# Patient Record
Sex: MALE | Race: WHITE | NOT HISPANIC OR LATINO | Employment: UNEMPLOYED | URBAN - METROPOLITAN AREA
[De-identification: names, ages, dates, MRNs, and addresses within clinical notes are randomized per-mention and may not be internally consistent; named-entity substitution may affect disease eponyms.]

---

## 2019-07-16 LAB — CRC RECOMMENDATION EXT: NORMAL

## 2019-10-02 ENCOUNTER — HISTORICAL (OUTPATIENT)
Dept: ADMINISTRATIVE | Facility: HOSPITAL | Age: 44
End: 2019-10-02

## 2019-10-10 ENCOUNTER — HISTORICAL (OUTPATIENT)
Dept: SURGERY | Facility: HOSPITAL | Age: 44
End: 2019-10-10

## 2020-08-04 ENCOUNTER — OFFICE VISIT (OUTPATIENT)
Dept: PRIMARY CARE CLINIC | Facility: CLINIC | Age: 45
End: 2020-08-04
Payer: MEDICAID

## 2020-08-04 VITALS
HEART RATE: 69 BPM | RESPIRATION RATE: 18 BRPM | OXYGEN SATURATION: 97 % | DIASTOLIC BLOOD PRESSURE: 88 MMHG | SYSTOLIC BLOOD PRESSURE: 138 MMHG | WEIGHT: 304.63 LBS | HEIGHT: 74 IN | BODY MASS INDEX: 39.1 KG/M2

## 2020-08-04 DIAGNOSIS — E78.00 PURE HYPERCHOLESTEROLEMIA: ICD-10-CM

## 2020-08-04 DIAGNOSIS — Z11.4 ENCOUNTER FOR SCREENING FOR HIV: ICD-10-CM

## 2020-08-04 DIAGNOSIS — Z11.59 NEED FOR HEPATITIS C SCREENING TEST: ICD-10-CM

## 2020-08-04 DIAGNOSIS — F41.9 ANXIETY: ICD-10-CM

## 2020-08-04 DIAGNOSIS — R11.2 NON-INTRACTABLE VOMITING WITH NAUSEA, UNSPECIFIED VOMITING TYPE: ICD-10-CM

## 2020-08-04 DIAGNOSIS — E07.9 THYROID DISEASE: ICD-10-CM

## 2020-08-04 DIAGNOSIS — Z12.5 SCREENING PSA (PROSTATE SPECIFIC ANTIGEN): ICD-10-CM

## 2020-08-04 DIAGNOSIS — E53.8 VITAMIN B12 DEFICIENCY: ICD-10-CM

## 2020-08-04 DIAGNOSIS — I10 ESSENTIAL HYPERTENSION: Primary | ICD-10-CM

## 2020-08-04 DIAGNOSIS — E55.9 VITAMIN D DEFICIENCY: ICD-10-CM

## 2020-08-04 DIAGNOSIS — Z79.899 LONG TERM USE OF DRUG: ICD-10-CM

## 2020-08-04 DIAGNOSIS — R16.0 LIVER MASS: ICD-10-CM

## 2020-08-04 DIAGNOSIS — R10.11 RUQ PAIN: ICD-10-CM

## 2020-08-04 DIAGNOSIS — C96.9 MALIGNANT NEOPLASM OF LYMPHOID, HEMATOPOIETIC AND RELATED TISSUE, UNSPECIFIED: ICD-10-CM

## 2020-08-04 DIAGNOSIS — E88.819 INSULIN RESISTANCE: ICD-10-CM

## 2020-08-04 DIAGNOSIS — N30.01 ACUTE CYSTITIS WITH HEMATURIA: ICD-10-CM

## 2020-08-04 PROCEDURE — 99204 PR OFFICE/OUTPT VISIT, NEW, LEVL IV, 45-59 MIN: ICD-10-PCS | Mod: S$GLB,,, | Performed by: NURSE PRACTITIONER

## 2020-08-04 PROCEDURE — 99204 OFFICE O/P NEW MOD 45 MIN: CPT | Mod: S$GLB,,, | Performed by: NURSE PRACTITIONER

## 2020-08-04 RX ORDER — SERTRALINE HYDROCHLORIDE 50 MG/1
50 TABLET, FILM COATED ORAL DAILY
Qty: 30 TABLET | Refills: 0 | Status: SHIPPED | OUTPATIENT
Start: 2020-08-04 | End: 2020-09-14 | Stop reason: SDUPTHER

## 2020-08-04 RX ORDER — LISINOPRIL 10 MG/1
10 TABLET ORAL DAILY
Qty: 30 TABLET | Refills: 2 | Status: SHIPPED | OUTPATIENT
Start: 2020-08-04 | End: 2020-08-12 | Stop reason: SDUPTHER

## 2020-08-04 NOTE — PATIENT INSTRUCTIONS
Continue medications as directed.     Continue to follow up with specialists as directed.    Report to nearest ER or call 911 if you begin to have worsening symptoms, difficulty breathing, turning blue, chest pain, B/P < 80/60 or >170/100, palpitations, syncope, extreme weakness, severe abdominal pain, or severe H/A. Patient verbalized understanding.     RTC in 1 week to review and discuss results.      Established High Blood Pressure    High blood pressure (hypertension) is a chronic disease. Often, healthcare providers dont know what causes it. But it can be caused by certain health conditions and medicines.  If you have high blood pressure, you may not have any symptoms. If you do have symptoms, they may include headache, dizziness, changes in your vision, chest pain, and shortness of breath. But even without symptoms, high blood pressure thats not treated raises your risk for heart attack and stroke. High blood pressure is a serious health risk and shouldnt be ignored.  A blood pressure reading is made up of two numbers: a higher number over a lower number. The top number is the systolic pressure. The bottom number is the diastolic pressure. A normal blood pressure is a systolic pressure of  less than 120 over a diastolic pressure of less than 80. You will see your blood pressure readings written together. For example, a person with a systolic pressure of 188 and a diastolic pressure of 78 will have 118/78 written in the medical record.  High blood pressure is when either the top number is 140 or higher, or the bottom number is 90 or higher. This must be the result when taking your blood pressure a number of times. The blood pressures between normal and high are called prehypertension.  Home care  If you have high blood pressure, you should do what is listed below to lower your blood pressure. If you are taking medicines for high blood pressure, these methods may reduce or end your need for medicines in the  future.  · Begin a weight-loss program if you are overweight.  · Cut back on how much salt you get in your diet. Heres how to do this:  ¨ Dont eat foods that have a lot of salt. These include olives, pickles, smoked meats, and salted potato chips.  ¨ Dont add salt to your food at the table.  ¨ Use only small amounts of salt when cooking.  · Start an exercise program. Talk with your healthcare provider about the type of exercise program that would be best for you. It doesn't have to be hard. Even brisk walking for 20 minutes 3 times a week is a good form of exercise.  · Dont take medicines that stimulate the heart. This includes many over-the-counter cold and sinus decongestant pills and sprays, as well as diet pills. Check the warnings about hypertension on the label. Before buying any over-the-counter medicines or supplements, always ask the pharmacist about the product's potential interaction with your high blood pressure and your high blood pressure medicines.  · Stimulants such as amphetamine or cocaine could be deadly for someone with high blood pressure. Never take these.  · Limit how much caffeine you get in your diet. Switch to caffeine-free products.  · Stop smoking. If you are a long-time smoker, this can be hard. Talk to your healthcare provider about medicines and nicotine replacement options to help you. Also, enroll in a stop-smoking program to make it more likely that you will quit for good.  · Learn how to handle stress. This is an important part of any program to lower blood pressure. Learn about relaxation methods like meditation, yoga, or biofeedback.  · If your provider prescribed medicines, take them exactly as directed. Missing doses may cause your blood pressure get out of control.  · If you miss a dose or doses, check with your healthcare provider or pharmacist about what to do.  · Consider buying an automatic blood pressure machine. Ask your provider for a recommendation. You can get one  of these at most pharmacies.     The American Heart Association recommends the following guidelines for home blood pressure monitoring:  · Don't smoke or drink coffee for 30 minutes before taking your blood pressure.  · Go to the bathroom before the test.  · Relax for 5 minutes before taking the measurement.  · Sit with your back supported (don't sit on a couch or soft chair); keep your feet on the floor uncrossed. Place your arm on a solid flat surface (like a table) with the upper part of the arm at heart level. Place the middle of the cuff directly above the eye of the elbow. Check the monitor's instruction manual for an illustration.  · Take multiple readings. When you measure, take 2 to 3 readings one minute apart and record all of the results.  · Take your blood pressure at the same time every day, or as your healthcare provider recommends.  · Record the date, time, and blood pressure reading.  · Take the record with you to your next medical appointment. If your blood pressure monitor has a built-in memory, simply take the monitor with you to your next appointment.  · Call your provider if you have several high readings. Don't be frightened by a single high blood pressure reading, but if you get several high readings, check in with your healthcare provider.  · Note: When blood pressure reaches a systolic (top number) of 180 or higher OR diastolic (bottom number) of 110 or higher, seek emergency medical treatment.  Follow-up care  You will need to see your healthcare provider regularly. This is to check your blood pressure and to make changes to your medicines. Make a follow-up appointment as directed. Bring the record of your home blood pressure readings to the appointment.  When to seek medical advice  Call your healthcare provider right away if any of these occur:  · Blood pressure reaches a systolic (upper number) of 180 or higher OR a diastolic (bottom number) of 110 or higher  · Chest pain or shortness of  breath  · Severe headache  · Throbbing or rushing sound in the ears  · Nosebleed  · Sudden severe pain in your belly (abdomen)  · Extreme drowsiness, confusion, or fainting  · Dizziness or spinning sensation (vertigo)  · Weakness of an arm or leg or one side of the face  · You have problems speaking or seeing   Date Last Reviewed: 12/1/2016  © 2865-7281 [employer]. [address] [place] [address], [place] [address]. All rights reserved. This information is not intended as a substitute for professional medical care. Always follow your healthcare professional's instructions.          Treating Anxiety Disorders with Medicine  An anxiety disorder can make you feel nervous or apprehensive, even without a clear reason. In people age 65 and older, generalized anxiety disorder is one of the most commonly diagnosed anxiety disorders. Many times it occurs with depression. Certain anxiety disorders can cause intense feelings of fear or panic. You may even have physical symptoms such as a racing heartbeat, sweating, or dizziness. If you have these feelings, you dont have to suffer anymore. Treatment to help you overcome your fears will likely include therapy (also called counseling). Medicine may also be prescribed to help control your symptoms.    Medicines  Certain medicines may be prescribed to help control your symptoms. So you may feel less anxious. You may also feel able to move forward with therapy. At first, medicines and dosages may need to be adjusted to find what works best for you. Try to be patient. Tell your healthcare provider how a medicine makes you feel. This way, you can work together to find the treatment thats best for you. Keep in mind that medicines can have side effects. Talk with your provider about any side effects that are bothering you. Changing the dose or type of medicine may help. Dont stop taking medicine on your own. That can cause symptoms to come back.  · Anti-anxiety medicine. This  medicine eases symptoms and helps you relax. Your healthcare provider will explain when and how to use it. It may be prescribed for use before situations that make you anxious. You may also be told to take medicine on a regular schedule. Anti-anxiety medicine may make you feel a little sleepy or out of it. Dont drive a car or operate machinery while on this medicine, until you know how it affects you.  Caution  Never use alcohol or other drugs with anti-anxiety medicines. This could result in loss of muscular control, sedation, coma, or death. Also, use only the amount of medicine prescribed for you. If you think you may have taken too much, get emergency care right away.   · Antidepressant medicine. This kind of medicine is often used to treat anxiety, even if you arent depressed. An antidepressant helps balance out brain chemicals. This helps keep anxiety under control. This medicine is taken on a schedule. It takes a few weeks to start working. If you dont notice a change at first, you may just need more time. But if you dont notice results after the first few weeks, tell your provider.  Keep taking medicines as prescribed  Never change your dosage, share or use another person's medicine, or stop taking your medicines without talking to your healthcare provider first. Keep the following in mind:  · Some medicines must be taken on a schedule. Make this part of your daily routine. For instance, always take your pill before brushing your teeth. A pillbox can help you remember if youve taken your medicine each day.  · Medicines are often taken for 6 to 12 months. Your healthcare provider will then evaluate whether you need to stay on them. Many people who have also had therapy may no longer need medicine to manage anxiety.  · You may need to stop taking medicine slowly to give your body time to adjust. When its time to stop, your healthcare provider will tell you more. Remember: Never stop taking your  medicine without talking to your provider first.  · If symptoms return, you may need to start taking medicines again. This isnt your fault. Its just the nature of your anxiety disorder.  Special concerns  · Side effects. Medicines may cause side effects. Ask your healthcare provider or pharmacist what you can expect. They may have ideas for avoiding some side effects.  · Sexual problems. Some antidepressants can affect your desire for sex or your ability to have an orgasm. A change in dosage or medicine often solves the problem. If you have a sexual side effect that concerns you, tell your healthcare provider.  · Addiction. If youve never had a problem with drugs or alcohol, you may not have a problem with medicines used to treat anxiety disorders. But always discuss the medicines with your healthcare provider before taking them. If you have a history of addiction, you may not be able to use certain medicines used to treat anxiety disorders.  · Medicine interactions. Always check with your pharmacist before using any over-the-counter medicines, including herbal supplements.   Date Last Reviewed: 5/1/2017 © 2000-2017 [employer]. [address] [place] [address]. All rights reserved. This information is not intended as a substitute for professional medical care. Always follow your healthcare professional's instructions.          Weight Management: Overcoming Your Barriers    You may have many reasons why youre not ready to lose weight. You may not feel you have the time or the skills. You may be afraid of losing weight and gaining it back again. Well, you can lose weight. And you can keep the weight off, if you make changes slowly and stick with them. Remember that you may never find the perfect time to lose weight. Decide that the right time to be healthier is now.  Common barriers  Barrier 1: I dont want to deny myself.  Barrier Buster: You dont have to! Moderation is the key:  · Watch  portion sizes and know when you're eating more than one serving.  · Plan to ask for a doggy bag when you eat out.  · Have just one.  · Choose lower-fat and lower-calorie versions of your favorites.  · Use a small plate instead of a normal-sized plate.   Barrier 2: I lost weight before but I gained it right back.  Barrier Buster: Make this time different:  · List what worked and didnt work last time and what you can try this time.  · Choose changes that you are willing to stick with.  · Work exercise into your weight-loss plan.  · Be realistic about what is possible. Your plan has to fit into your life in a balanced way that works for you.   Barrier 3: I dont have the time to be active.  Barrier Buster: It takes just a few minutes a day!  · Be active with a pet or the kids.  · Block off activity time in your schedule.  · Borrow some time that you usually spend watching TV.  · You are too important not to take time to exercise--it is your life!   Feel good about yourself  Do you eat more because you feel bad about yourself, then feel even worse as you gain weight? This is a vicious cycle. Breaking this cycle is not easy. You may need group support or counseling. Always remember that you are a valuable person, no matter what size or shape you are.  Do you have a health problem? If so, dont use it as an excuse for not losing weight. Ask your healthcare provider or dietitian about methods to lose weight that are safe for you. For example, even if you have severe arthritis, it may be easier for you to exercise in a pool. Get advice from a .    Date Last Reviewed: 2/2/2016  © 8165-6136 [employer]. [address] [place] [address], [place] [address]. All rights reserved. This information is not intended as a substitute for professional medical care. Always follow your healthcare professional's instructions.

## 2020-08-05 LAB
25(OH)D3 SERPL-MCNC: 14 NG/ML (ref 30–100)
ALBUMIN SERPL-MCNC: 4.5 G/DL (ref 3.6–5.1)
ALBUMIN/GLOB SERPL: 1.9 (CALC) (ref 1–2.5)
ALP SERPL-CCNC: 79 U/L (ref 36–130)
ALT SERPL-CCNC: 28 U/L (ref 9–46)
AST SERPL-CCNC: 16 U/L (ref 10–40)
BASOPHILS # BLD AUTO: 19 CELLS/UL (ref 0–200)
BASOPHILS NFR BLD AUTO: 0.3 %
BILIRUB SERPL-MCNC: 1.5 MG/DL (ref 0.2–1.2)
BUN SERPL-MCNC: 12 MG/DL (ref 7–25)
BUN/CREAT SERPL: ABNORMAL (CALC) (ref 6–22)
CALCIUM SERPL-MCNC: 9.5 MG/DL (ref 8.6–10.3)
CHLORIDE SERPL-SCNC: 103 MMOL/L (ref 98–110)
CHOLEST SERPL-MCNC: 149 MG/DL
CHOLEST/HDLC SERPL: 4.7 (CALC)
CO2 SERPL-SCNC: 29 MMOL/L (ref 20–32)
CREAT SERPL-MCNC: 1.02 MG/DL (ref 0.6–1.35)
EOSINOPHIL # BLD AUTO: 68 CELLS/UL (ref 15–500)
EOSINOPHIL NFR BLD AUTO: 1.1 %
ERYTHROCYTE [DISTWIDTH] IN BLOOD BY AUTOMATED COUNT: 14.4 % (ref 11–15)
GFRSERPLBLD MDRD-ARVRAT: 89 ML/MIN/1.73M2
GLOBULIN SER CALC-MCNC: 2.4 G/DL (CALC) (ref 1.9–3.7)
GLUCOSE SERPL-MCNC: 96 MG/DL (ref 65–99)
HBA1C MFR BLD: 5.1 % OF TOTAL HGB
HCT VFR BLD AUTO: 46.4 % (ref 38.5–50)
HCV AB S/CO SERPL IA: 0.02
HCV AB SERPL QL IA: NORMAL
HDLC SERPL-MCNC: 32 MG/DL
HGB BLD-MCNC: 15.3 G/DL (ref 13.2–17.1)
HIV 1+2 AB+HIV1 P24 AG SERPL QL IA: NORMAL
LDLC SERPL CALC-MCNC: 83 MG/DL (CALC)
LYMPHOCYTES # BLD AUTO: 2040 CELLS/UL (ref 850–3900)
LYMPHOCYTES NFR BLD AUTO: 32.9 %
MCH RBC QN AUTO: 27.6 PG (ref 27–33)
MCHC RBC AUTO-ENTMCNC: 33 G/DL (ref 32–36)
MCV RBC AUTO: 83.8 FL (ref 80–100)
MONOCYTES # BLD AUTO: 329 CELLS/UL (ref 200–950)
MONOCYTES NFR BLD AUTO: 5.3 %
NEUTROPHILS # BLD AUTO: 3745 CELLS/UL (ref 1500–7800)
NEUTROPHILS NFR BLD AUTO: 60.4 %
NONHDLC SERPL-MCNC: 117 MG/DL (CALC)
PLATELET # BLD AUTO: 204 THOUSAND/UL (ref 140–400)
PMV BLD REES-ECKER: 10 FL (ref 7.5–12.5)
POTASSIUM SERPL-SCNC: 4.3 MMOL/L (ref 3.5–5.3)
PROT SERPL-MCNC: 6.9 G/DL (ref 6.1–8.1)
PSA SERPL-MCNC: 4.9 NG/ML
RBC # BLD AUTO: 5.54 MILLION/UL (ref 4.2–5.8)
SODIUM SERPL-SCNC: 138 MMOL/L (ref 135–146)
TRIGL SERPL-MCNC: 242 MG/DL
TSH SERPL-ACNC: 1.57 MIU/L (ref 0.4–4.5)
WBC # BLD AUTO: 6.2 THOUSAND/UL (ref 3.8–10.8)

## 2020-08-09 LAB
ACETONE BLD-MCNC: ABNORMAL MG/DL
ALBUMIN/CREAT UR: 3 MCG/MG CREAT
AMPHETAMINES SERPL QL: NEGATIVE
APPEARANCE UR: CLEAR
BACTERIA #/AREA URNS HPF: NORMAL /HPF
BACTERIA UR CULT: NORMAL
BARBITURATES SERPL-MCNC: NEGATIVE MG/DL
BENZODIAZ SERPL QL: NEGATIVE
BILIRUB UR QL STRIP: NEGATIVE
CARBOXYTHC SERPL-MCNC: 18 NG/ML
COCAINE SERPL QL: NEGATIVE
COLOR UR: YELLOW
CREAT UR-MCNC: 209 MG/DL (ref 20–320)
ETHANOL BLD-MCNC: ABNORMAL G/DL(%)
ETHANOL BLD-MCNC: ABNORMAL MG/DL
FOLATE SERPL-MCNC: 8.9 NG/ML
GLUCOSE UR QL STRIP: NEGATIVE
HGB UR QL STRIP: NEGATIVE
HYALINE CASTS #/AREA URNS LPF: NORMAL /LPF
ISOPROPANOL BLD-MCNC: ABNORMAL MG/DL
KETONES UR QL STRIP: NEGATIVE
LEUKOCYTE ESTERASE UR QL STRIP: NEGATIVE
METHADONE SERPL-MCNC: NEGATIVE NG/ML
METHANOL BLD-MCNC: ABNORMAL MG/DL
MICROALBUMIN UR-MCNC: 0.7 MG/DL
NITRITE UR QL STRIP: NEGATIVE
OPIATES SERPL QL: NEGATIVE
PCP SERPL QL: NEGATIVE
PH UR STRIP: NORMAL [PH] (ref 5–8)
PROPOXYPH SERPL-MCNC: NEGATIVE NG/ML
PROT UR QL STRIP: NEGATIVE
RBC #/AREA URNS HPF: NORMAL /HPF
SERVICE CMNT-IMP: ABNORMAL
SP GR UR STRIP: 1.02 (ref 1–1.03)
SPECIMEN SOURCE: ABNORMAL
SQUAMOUS #/AREA URNS HPF: NORMAL /HPF
T3FREE SERPL-MCNC: 3.4 PG/ML (ref 2.3–4.2)
T4 FREE SERPL-MCNC: 1.2 NG/DL (ref 0.8–1.8)
THC SERPL-MCNC: 1 NG/ML
THC SERPL-MCNC: POSITIVE NG/ML
VIT B12 SERPL-MCNC: 455 PG/ML (ref 200–1100)
WBC #/AREA URNS HPF: NORMAL /HPF

## 2020-08-12 ENCOUNTER — OFFICE VISIT (OUTPATIENT)
Dept: PRIMARY CARE CLINIC | Facility: CLINIC | Age: 45
End: 2020-08-12
Payer: MEDICAID

## 2020-08-12 VITALS
HEART RATE: 70 BPM | OXYGEN SATURATION: 98 % | TEMPERATURE: 98 F | DIASTOLIC BLOOD PRESSURE: 82 MMHG | WEIGHT: 302.81 LBS | RESPIRATION RATE: 17 BRPM | HEIGHT: 74 IN | BODY MASS INDEX: 38.86 KG/M2 | SYSTOLIC BLOOD PRESSURE: 130 MMHG

## 2020-08-12 DIAGNOSIS — R97.20 ELEVATED PSA: ICD-10-CM

## 2020-08-12 DIAGNOSIS — I10 ESSENTIAL HYPERTENSION: ICD-10-CM

## 2020-08-12 DIAGNOSIS — E55.9 VITAMIN D DEFICIENCY: ICD-10-CM

## 2020-08-12 DIAGNOSIS — E78.1 HYPERTRIGLYCERIDEMIA: ICD-10-CM

## 2020-08-12 DIAGNOSIS — N30.01 ACUTE CYSTITIS WITH HEMATURIA: ICD-10-CM

## 2020-08-12 DIAGNOSIS — R16.0 LIVER MASS: ICD-10-CM

## 2020-08-12 DIAGNOSIS — F41.9 ANXIETY: Primary | ICD-10-CM

## 2020-08-12 PROCEDURE — 99214 PR OFFICE/OUTPT VISIT, EST, LEVL IV, 30-39 MIN: ICD-10-PCS | Mod: S$GLB,,, | Performed by: NURSE PRACTITIONER

## 2020-08-12 PROCEDURE — 99214 OFFICE O/P EST MOD 30 MIN: CPT | Mod: S$GLB,,, | Performed by: NURSE PRACTITIONER

## 2020-08-12 RX ORDER — LISINOPRIL 10 MG/1
10 TABLET ORAL DAILY
Qty: 30 TABLET | Refills: 2 | Status: SHIPPED | OUTPATIENT
Start: 2020-08-12 | End: 2020-09-14 | Stop reason: SDUPTHER

## 2020-08-12 RX ORDER — ERGOCALCIFEROL 1.25 MG/1
50000 CAPSULE ORAL
Qty: 12 CAPSULE | Refills: 0 | Status: SHIPPED | OUTPATIENT
Start: 2020-08-12 | End: 2020-09-14 | Stop reason: SDUPTHER

## 2020-08-12 NOTE — PATIENT INSTRUCTIONS
Continue medications as directed.     Continue to follow up with specialists as directed.    Report to nearest ER or call 911 if you begin to have worsening symptoms, difficulty breathing, turning blue, chest pain, B/P < 80/60 or >170/100, palpitations, syncope, extreme weakness, severe abdominal pain, or severe H/A.     RTC in 3 months with fasting labs completed 1 week prior to appointment, or sooner if needed. Will further develop plan of care based on results.      Established High Blood Pressure    High blood pressure (hypertension) is a chronic disease. Often, healthcare providers dont know what causes it. But it can be caused by certain health conditions and medicines.  If you have high blood pressure, you may not have any symptoms. If you do have symptoms, they may include headache, dizziness, changes in your vision, chest pain, and shortness of breath. But even without symptoms, high blood pressure thats not treated raises your risk for heart attack and stroke. High blood pressure is a serious health risk and shouldnt be ignored.  A blood pressure reading is made up of two numbers: a higher number over a lower number. The top number is the systolic pressure. The bottom number is the diastolic pressure. A normal blood pressure is a systolic pressure of  less than 120 over a diastolic pressure of less than 80. You will see your blood pressure readings written together. For example, a person with a systolic pressure of 188 and a diastolic pressure of 78 will have 118/78 written in the medical record.  High blood pressure is when either the top number is 140 or higher, or the bottom number is 90 or higher. This must be the result when taking your blood pressure a number of times. The blood pressures between normal and high are called prehypertension.  Home care  If you have high blood pressure, you should do what is listed below to lower your blood pressure. If you are taking medicines for high blood  pressure, these methods may reduce or end your need for medicines in the future.  · Begin a weight-loss program if you are overweight.  · Cut back on how much salt you get in your diet. Heres how to do this:  ¨ Dont eat foods that have a lot of salt. These include olives, pickles, smoked meats, and salted potato chips.  ¨ Dont add salt to your food at the table.  ¨ Use only small amounts of salt when cooking.  · Start an exercise program. Talk with your healthcare provider about the type of exercise program that would be best for you. It doesn't have to be hard. Even brisk walking for 20 minutes 3 times a week is a good form of exercise.  · Dont take medicines that stimulate the heart. This includes many over-the-counter cold and sinus decongestant pills and sprays, as well as diet pills. Check the warnings about hypertension on the label. Before buying any over-the-counter medicines or supplements, always ask the pharmacist about the product's potential interaction with your high blood pressure and your high blood pressure medicines.  · Stimulants such as amphetamine or cocaine could be deadly for someone with high blood pressure. Never take these.  · Limit how much caffeine you get in your diet. Switch to caffeine-free products.  · Stop smoking. If you are a long-time smoker, this can be hard. Talk to your healthcare provider about medicines and nicotine replacement options to help you. Also, enroll in a stop-smoking program to make it more likely that you will quit for good.  · Learn how to handle stress. This is an important part of any program to lower blood pressure. Learn about relaxation methods like meditation, yoga, or biofeedback.  · If your provider prescribed medicines, take them exactly as directed. Missing doses may cause your blood pressure get out of control.  · If you miss a dose or doses, check with your healthcare provider or pharmacist about what to do.  · Consider buying an automatic blood  pressure machine. Ask your provider for a recommendation. You can get one of these at most pharmacies.     The American [employer] Association recommends the following guidelines for home blood pressure monitoring:  · Don't smoke or drink coffee for 30 minutes before taking your blood pressure.  · Go to the bathroom before the test.  · Relax for 5 minutes before taking the measurement.  · Sit with your back supported (don't sit on a couch or soft chair); keep your feet on the floor uncrossed. Place your arm on a solid flat surface (like a table) with the upper part of the arm at heart level. Place the middle of the cuff directly above the eye of the elbow. Check the monitor's instruction manual for an illustration.  · Take multiple readings. When you measure, take 2 to 3 readings one minute apart and record all of the results.  · Take your blood pressure at the same time every day, or as your healthcare provider recommends.  · Record the date, time, and blood pressure reading.  · Take the record with you to your next medical appointment. If your blood pressure monitor has a built-in memory, simply take the monitor with you to your next appointment.  · Call your provider if you have several high readings. Don't be frightened by a single high blood pressure reading, but if you get several high readings, check in with your healthcare provider.  · Note: When blood pressure reaches a systolic (top number) of 180 or higher OR diastolic (bottom number) of 110 or higher, seek emergency medical treatment.  Follow-up care  You will need to see your healthcare provider regularly. This is to check your blood pressure and to make changes to your medicines. Make a follow-up appointment as directed. Bring the record of your home blood pressure readings to the appointment.  When to seek medical advice  Call your healthcare provider right away if any of these occur:  · Blood pressure reaches a systolic (upper number) of 180 or higher OR  a diastolic (bottom number) of 110 or higher  · Chest pain or shortness of breath  · Severe headache  · Throbbing or rushing sound in the ears  · Nosebleed  · Sudden severe pain in your belly (abdomen)  · Extreme drowsiness, confusion, or fainting  · Dizziness or spinning sensation (vertigo)  · Weakness of an arm or leg or one side of the face  · You have problems speaking or seeing   Date Last Reviewed: 12/1/2016 © 2000-2017 [employer] [address] [place] [id number]. All rights reserved. This information is not intended as a substitute for professional medical care. Always follow your healthcare professional's instructions.          Treating Anxiety Disorders with Medicine  An anxiety disorder can make you feel nervous or apprehensive, even without a clear reason. In people age 65 and older, generalized anxiety disorder is one of the most commonly diagnosed anxiety disorders. Many times it occurs with depression. Certain anxiety disorders can cause intense feelings of fear or panic. You may even have physical symptoms such as a racing heartbeat, sweating, or dizziness. If you have these feelings, you dont have to suffer anymore. Treatment to help you overcome your fears will likely include therapy (also called counseling). Medicine may also be prescribed to help control your symptoms.    Medicines  Certain medicines may be prescribed to help control your symptoms. So you may feel less anxious. You may also feel able to move forward with therapy. At first, medicines and dosages may need to be adjusted to find what works best for you. Try to be patient. Tell your healthcare provider how a medicine makes you feel. This way, you can work together to find the treatment thats best for you. Keep in mind that medicines can have side effects. Talk with your provider about any side effects that are bothering you. Changing the dose or type of medicine may help. Dont stop taking medicine on your  own. That can cause symptoms to come back.  · Anti-anxiety medicine. This medicine eases symptoms and helps you relax. Your healthcare provider will explain when and how to use it. It may be prescribed for use before situations that make you anxious. You may also be told to take medicine on a regular schedule. Anti-anxiety medicine may make you feel a little sleepy or out of it. Dont drive a car or operate machinery while on this medicine, until you know how it affects you.  Caution  Never use alcohol or other drugs with anti-anxiety medicines. This could result in loss of muscular control, sedation, coma, or death. Also, use only the amount of medicine prescribed for you. If you think you may have taken too much, get emergency care right away.   · Antidepressant medicine. This kind of medicine is often used to treat anxiety, even if you arent depressed. An antidepressant helps balance out brain chemicals. This helps keep anxiety under control. This medicine is taken on a schedule. It takes a few weeks to start working. If you dont notice a change at first, you may just need more time. But if you dont notice results after the first few weeks, tell your provider.  Keep taking medicines as prescribed  Never change your dosage, share or use another person's medicine, or stop taking your medicines without talking to your healthcare provider first. Keep the following in mind:  · Some medicines must be taken on a schedule. Make this part of your daily routine. For instance, always take your pill before brushing your teeth. A pillbox can help you remember if youve taken your medicine each day.  · Medicines are often taken for 6 to 12 months. Your healthcare provider will then evaluate whether you need to stay on them. Many people who have also had therapy may no longer need medicine to manage anxiety.  · You may need to stop taking medicine slowly to give your body time to adjust. When its time to stop, your  healthcare provider will tell you more. Remember: Never stop taking your medicine without talking to your provider first.  · If symptoms return, you may need to start taking medicines again. This isnt your fault. Its just the nature of your anxiety disorder.  Special concerns  · Side effects. Medicines may cause side effects. Ask your healthcare provider or pharmacist what you can expect. They may have ideas for avoiding some side effects.  · Sexual problems. Some antidepressants can affect your desire for sex or your ability to have an orgasm. A change in dosage or medicine often solves the problem. If you have a sexual side effect that concerns you, tell your healthcare provider.  · Addiction. If youve never had a problem with drugs or alcohol, you may not have a problem with medicines used to treat anxiety disorders. But always discuss the medicines with your healthcare provider before taking them. If you have a history of addiction, you may not be able to use certain medicines used to treat anxiety disorders.  · Medicine interactions. Always check with your pharmacist before using any over-the-counter medicines, including herbal supplements.   Date Last Reviewed: 5/1/2017 © 2000-2017 [employer]. [address] [place] [address], [place] [address]. All rights reserved. This information is not intended as a substitute for professional medical care. Always follow your healthcare professional's instructions.

## 2020-08-12 NOTE — PROGRESS NOTES
Subjective:       Patient ID: Tray Perez is a 45 y.o. male.    Chief Complaint: Follow-up and Abdominal Pain    HPI:    Presents for 1 week f/u to review and discuss lab results.     Anxiety - reports no change from last week. Taking Zoloft 50mg as directed, denies SI/HI. Would like referral to Psych for therapy.     HTN - taking meds as directed, denies med s/e. Has not checked b/p at home.         FROM PREVIOUS ENCOUNTER AND DISCUSSED TODAY:    Presents to [facility] care with new PCP. Previous PCP Dr. Rosen retired.     C/o RUQ pain for several months. Went to [facility] ER. CT scan of abd showed mass on liver >4cm, blood in urine, and was told to f/u with PCP. Reports also had UTI and was given abx 3 weeks ago. C/o continued RUQ abd pain, N/V/D, painful urination, and anxious about everything going on. Denies SI/HI. Wasn't sure where to go from here. Lives with elderly aunt whom he cares for, barely leaves the house, has elderly parents, and is scared of COVID, does not want to spread to family. Very overwhelmed and anxious from this pandemic.     HTN - used to take Lisinopril 10mg 1 PO daily, has been out of med for sometime, needs RF. Tolerated well previously w/o s/e.     Reports has gained several pounds over the past 3 months, not eating healthy diet or exercising.           Past Medical History:   Diagnosis Date    Hypertension     IBS (irritable bowel syndrome)        Past Surgical History:   Procedure Laterality Date    CARPAL TUNNEL RELEASE      ULNAR TUNNEL RELEASE         Family History   Problem Relation Age of Onset    Heart disease Father     Hypertension Father        Social History     Tobacco Use    Smoking status: Never Smoker    Smokeless tobacco: Never Used   Substance Use Topics    Alcohol use: Not Currently    Drug use: Never       Patient Active Problem List   Diagnosis    Essential hypertension    Anxiety    Elevated PSA    Vitamin D deficiency    Liver mass  "        There is no immunization history on file for this patient.        Review of Systems   Constitutional: Positive for fatigue.   Respiratory: Negative for cough, chest tightness, shortness of breath and wheezing.    Cardiovascular: Negative for chest pain, palpitations and leg swelling.   Gastrointestinal: Positive for abdominal pain. Negative for abdominal distention and blood in stool.   Genitourinary: Positive for urgency.   Neurological: Negative for dizziness.     Objective:     Vitals:    08/12/20 0815 08/12/20 0857   BP: (!) 164/96 130/82   BP Location: Left arm    Patient Position: Sitting    BP Method: Large (Manual)    Pulse: 70    Resp: 17    Temp: 98.1 °F (36.7 °C)    TempSrc: Temporal    SpO2: 98%    Weight: (!) 137.3 kg (302 lb 12.8 oz)    Height: 6' 2" (1.88 m)        Physical Exam  Vitals signs and nursing note reviewed.   Constitutional:       General: He is not in acute distress.     Appearance: He is well-developed. He is obese. He is not diaphoretic.   HENT:      Head: Normocephalic and atraumatic.      Mouth/Throat:      Mouth: Mucous membranes are not pale, not dry and not cyanotic.   Neck:      Thyroid: No thyromegaly.      Vascular: No JVD.      Trachea: No tracheal deviation.   Cardiovascular:      Rate and Rhythm: Normal rate and regular rhythm.      Heart sounds: Normal heart sounds. No murmur.   Pulmonary:      Effort: Pulmonary effort is normal. No respiratory distress.      Breath sounds: Normal breath sounds. No wheezing.   Abdominal:      General: Bowel sounds are normal. There is no distension.      Palpations: Abdomen is soft.      Tenderness: There is abdominal tenderness in the right upper quadrant.   Musculoskeletal:         General: No swelling.      Comments: Moves all extremities freely   Skin:     General: Skin is warm and dry.      Coloration: Skin is not jaundiced.      Findings: No bruising.   Neurological:      Mental Status: He is alert and oriented to person, " place, and time.      Gait: Gait normal.   Psychiatric:         Mood and Affect: Mood is anxious. Affect is flat.         Speech: Speech normal.         Behavior: Behavior normal. Behavior is cooperative.         Thought Content: Thought content does not include homicidal or suicidal ideation.         Office Visit on 08/04/2020   Component Date Value Ref Range Status    WBC 08/04/2020 6.2  3.8 - 10.8 Thousand/uL Final    RBC 08/04/2020 5.54  4.20 - 5.80 Million/uL Final    Hemoglobin 08/04/2020 15.3  13.2 - 17.1 g/dL Final    Hematocrit 08/04/2020 46.4  38.5 - 50.0 % Final    Mean Corpuscular Volume 08/04/2020 83.8  80.0 - 100.0 fL Final    Mean Corpuscular Hemoglobin 08/04/2020 27.6  27.0 - 33.0 pg Final    Mean Corpuscular Hemoglobin Conc 08/04/2020 33.0  32.0 - 36.0 g/dL Final    RDW 08/04/2020 14.4  11.0 - 15.0 % Final    Platelets 08/04/2020 204  140 - 400 Thousand/uL Final    MPV 08/04/2020 10.0  7.5 - 12.5 fL Final    Neutrophils Absolute 08/04/2020 3,745  1,500 - 7,800 cells/uL Final    Lymph # 08/04/2020 2,040  850 - 3,900 cells/uL Final    Mono # 08/04/2020 329  200 - 950 cells/uL Final    Eos # 08/04/2020 68  15 - 500 cells/uL Final    Baso # 08/04/2020 19  0 - 200 cells/uL Final    Neutrophils Relative 08/04/2020 60.4  % Final    Lymph% 08/04/2020 32.9  % Final    Mono% 08/04/2020 5.3  % Final    Eosinophil% 08/04/2020 1.1  % Final    Basophil% 08/04/2020 0.3  % Final    Glucose 08/04/2020 96  65 - 99 mg/dL Final    BUN, Bld 08/04/2020 12  7 - 25 mg/dL Final    Creatinine 08/04/2020 1.02  0.60 - 1.35 mg/dL Final    eGFR if non African American 08/04/2020 89  > OR = 60 mL/min/1.73m2 Final    eGFR if African American 08/04/2020 103  > OR = 60 mL/min/1.73m2 Final    BUN/Creatinine Ratio 08/04/2020 NOT APPLICABLE  6 - 22 (calc) Final    Sodium 08/04/2020 138  135 - 146 mmol/L Final    Potassium 08/04/2020 4.3  3.5 - 5.3 mmol/L Final    Chloride 08/04/2020 103  98 - 110 mmol/L  Final    CO2 08/04/2020 29  20 - 32 mmol/L Final    Calcium 08/04/2020 9.5  8.6 - 10.3 mg/dL Final    Total Protein 08/04/2020 6.9  6.1 - 8.1 g/dL Final    Albumin 08/04/2020 4.5  3.6 - 5.1 g/dL Final    Globulin, Total 08/04/2020 2.4  1.9 - 3.7 g/dL (calc) Final    Albumin/Globulin Ratio 08/04/2020 1.9  1.0 - 2.5 (calc) Final    Total Bilirubin 08/04/2020 1.5* 0.2 - 1.2 mg/dL Final    Alkaline Phosphatase 08/04/2020 79  36 - 130 U/L Final    AST 08/04/2020 16  10 - 40 U/L Final    ALT 08/04/2020 28  9 - 46 U/L Final    Hemoglobin A1C 08/04/2020 5.1  <5.7 % of total Hgb Final    Cholesterol 08/04/2020 149  <200 mg/dL Final    HDL 08/04/2020 32* > OR = 40 mg/dL Final    Triglycerides 08/04/2020 242* <150 mg/dL Final    LDL Cholesterol 08/04/2020 83  mg/dL (calc) Final    Hdl/Cholesterol Ratio 08/04/2020 4.7  <5.0 (calc) Final    Non HDL Chol. (LDL+VLDL) 08/04/2020 117  <130 mg/dL (calc) Final    T4, Free 08/04/2020 1.2  0.8 - 1.8 ng/dL Final    TSH 08/04/2020 1.57  0.40 - 4.50 mIU/L Final    Color, UA 08/04/2020 YELLOW  YELLOW Final    Appearance, UA 08/04/2020 CLEAR  CLEAR Final    Specific Gravity, UA 08/04/2020 1.022  1.001 - 1.035 Final    pH, UA 08/04/2020 < OR = 5.0  5.0 - 8.0 Final    Glucose, UA 08/04/2020 NEGATIVE  NEGATIVE Final    Bilirubin, UA 08/04/2020 NEGATIVE  NEGATIVE Final    Ketones, UA 08/04/2020 NEGATIVE  NEGATIVE Final    Occult Blood UA 08/04/2020 NEGATIVE  NEGATIVE Final    Protein, UA 08/04/2020 NEGATIVE  NEGATIVE Final    Nitrite, UA 08/04/2020 NEGATIVE  NEGATIVE Final    Leukocytes, UA 08/04/2020 NEGATIVE  NEGATIVE Final    WBC Casts, UA 08/04/2020 NONE SEEN  < OR = 5 /HPF Final    RBC Casts, UA 08/04/2020 NONE SEEN  < OR = 2 /HPF Final    Squam Epithel, UA 08/04/2020 NONE SEEN  < OR = 5 /HPF Final    Bacteria, UA 08/04/2020 NONE SEEN  NONE SEEN /HPF Final    Hyaline Casts, UA 08/04/2020 NONE SEEN  NONE SEEN /LPF Final    Reflexive Urine Culture  08/04/2020 NO CULTURE INDICATED   Final    Vitamin D, 25-OH, Total 08/04/2020 14* 30 - 100 ng/mL Final    T3, Free 08/04/2020 3.4  2.3 - 4.2 pg/mL Final    PROSTATE SPECIFIC ANTIGEN, SCR - Q* 08/04/2020 4.9* < OR = 4.0 ng/mL Final    HIV Ag/Ab 4th Gen 08/04/2020 NON-REACTIVE  NON-REACTIVE Final    Hepatitis C Ab 08/04/2020 NON-REACTIVE  NON-REACTIVE Final    Signal/Cutoff 08/04/2020 0.02  <1.00 Final    Creatinine, Random Ur 08/04/2020 209  20 - 320 mg/dL Final    Microalb, Ur 08/04/2020 0.7  See Note: mg/dL Final    Microalb Creat Ratio 08/04/2020 3  <30 mcg/mg creat Final    Methanol Lvl 08/04/2020 None Detected  mg/dL Final    Alcohol Scrn 08/04/2020 None Detected  mg/dL Final    Alcohol Scrn 08/04/2020 None Detected  g/dL(%) Final    Acetone, Bld 08/04/2020 None Detected  mg/dL Final    ALCOHOL, ISOPROPYL (B) 08/04/2020 None Detected  mg/dL Final    SPECIMEN SOURCE 08/04/2020 Whole Blood   Final    THC 08/04/2020 POSITIVE*  Final    Delta 9 THC 08/04/2020 1  ng/mL Final    Carboxy THC 08/04/2020 18  ng/mL Final    Amphetamines, Blood 08/04/2020 negative   Final    Barbiturates Screen, Blood 08/04/2020 negative   Final    Benzodiazepines Screen, Blood 08/04/2020 negative   Final    Cocaine Metabolites 08/04/2020 negative   Final    Opiates Screen, Blood 08/04/2020 negative   Final    Methadone, Serum 08/04/2020 negative   Final    Propoxyphene,Serum 08/04/2020 negative   Final    PCP Screen, urine 08/04/2020 negative   Final    Comment 08/04/2020 SEE NOTE   Final    Vitamin B-12 08/04/2020 455  200 - 1,100 pg/mL Final    Folate 08/04/2020 8.9  ng/mL Final     Reviewed and discussed lab results in depth with full understanding.       Assessment:      1. Anxiety    2. Liver mass    3. Vitamin D deficiency    4. Elevated PSA    5. Acute cystitis with hematuria    6. Essential hypertension    7. Essential hypertension    8. Hypertriglyceridemia          Plan:     Anxiety  Comments:  cont  Zoloft 50mg daily, refer to Psych  Orders:  -     Ambulatory referral/consult to Psychiatry; Future; Expected date: 08/19/2020    Liver mass  Comments:  CT scan abd with contrast scheduled for next monday, refer to GI  Orders:  -     Ambulatory referral/consult to Gastroenterology; Future; Expected date: 08/19/2020    Vitamin D deficiency  Comments:  vit d as directed X 3 months, recheck lab in 3 months  Orders:  -     ergocalciferol (ERGOCALCIFEROL) 50,000 unit Cap; Take 1 capsule (50,000 Units total) by mouth every 7 days.  Dispense: 12 capsule; Refill: 0    Elevated PSA  Comments:  refer to urology    Acute cystitis with hematuria  -     Ambulatory referral/consult to Urology; Future; Expected date: 08/19/2020    Essential hypertension  Comments:  controlled on med, cont med, goal b/p < 140/90. low sodium diet.  Orders:  -     lisinopriL 10 MG tablet; Take 1 tablet (10 mg total) by mouth once daily.  Dispense: 30 tablet; Refill: 2    Essential hypertension  Comments:  restart Lisinopril as directed, goal b/p <140/90  Orders:  -     lisinopriL 10 MG tablet; Take 1 tablet (10 mg total) by mouth once daily.  Dispense: 30 tablet; Refill: 2    Hypertriglyceridemia  Comments:  diet and exercise first - low cholesterol diet         Current Outpatient Medications   Medication Sig Dispense Refill    lisinopriL 10 MG tablet Take 1 tablet (10 mg total) by mouth once daily. 30 tablet 2    sertraline (ZOLOFT) 50 MG tablet Take 1 tablet (50 mg total) by mouth once daily. 30 tablet 0    ergocalciferol (ERGOCALCIFEROL) 50,000 unit Cap Take 1 capsule (50,000 Units total) by mouth every 7 days. 12 capsule 0     No current facility-administered medications for this visit.        Medications Discontinued During This Encounter   Medication Reason    lisinopriL 10 MG tablet Reorder       Health Maintenance   Topic Date Due    Lipid Panel  08/04/2025    TETANUS VACCINE  08/04/2030    Hepatitis C Screening  Completed        Patient Instructions     Continue medications as directed.     Continue to follow up with specialists as directed.    Report to nearest ER or call 911 if you begin to have worsening symptoms, difficulty breathing, turning blue, chest pain, B/P < 80/60 or >170/100, palpitations, syncope, extreme weakness, severe abdominal pain, or severe H/A.     RTC in 3 months with fasting labs completed 1 week prior to appointment, or sooner if needed. Will further develop plan of care based on results.      Established High Blood Pressure    High blood pressure (hypertension) is a chronic disease. Often, healthcare providers dont know what causes it. But it can be caused by certain health conditions and medicines.  If you have high blood pressure, you may not have any symptoms. If you do have symptoms, they may include headache, dizziness, changes in your vision, chest pain, and shortness of breath. But even without symptoms, high blood pressure thats not treated raises your risk for heart attack and stroke. High blood pressure is a serious health risk and shouldnt be ignored.  A blood pressure reading is made up of two numbers: a higher number over a lower number. The top number is the systolic pressure. The bottom number is the diastolic pressure. A normal blood pressure is a systolic pressure of  less than 120 over a diastolic pressure of less than 80. You will see your blood pressure readings written together. For example, a person with a systolic pressure of 188 and a diastolic pressure of 78 will have 118/78 written in the medical record.  High blood pressure is when either the top number is 140 or higher, or the bottom number is 90 or higher. This must be the result when taking your blood pressure a number of times. The blood pressures between normal and high are called prehypertension.  Home care  If you have high blood pressure, you should do what is listed below to lower your blood pressure. If you are taking  medicines for high blood pressure, these methods may reduce or end your need for medicines in the future.  · Begin a weight-loss program if you are overweight.  · Cut back on how much salt you get in your diet. Heres how to do this:  ¨ Dont eat foods that have a lot of salt. These include olives, pickles, smoked meats, and salted potato chips.  ¨ Dont add salt to your food at the table.  ¨ Use only small amounts of salt when cooking.  · Start an exercise program. Talk with your healthcare provider about the type of exercise program that would be best for you. It doesn't have to be hard. Even brisk walking for 20 minutes 3 times a week is a good form of exercise.  · Dont take medicines that stimulate the heart. This includes many over-the-counter cold and sinus decongestant pills and sprays, as well as diet pills. Check the warnings about hypertension on the label. Before buying any over-the-counter medicines or supplements, always ask the pharmacist about the product's potential interaction with your high blood pressure and your high blood pressure medicines.  · Stimulants such as amphetamine or cocaine could be deadly for someone with high blood pressure. Never take these.  · Limit how much caffeine you get in your diet. Switch to caffeine-free products.  · Stop smoking. If you are a long-time smoker, this can be hard. Talk to your healthcare provider about medicines and nicotine replacement options to help you. Also, enroll in a stop-smoking program to make it more likely that you will quit for good.  · Learn how to handle stress. This is an important part of any program to lower blood pressure. Learn about relaxation methods like meditation, yoga, or biofeedback.  · If your provider prescribed medicines, take them exactly as directed. Missing doses may cause your blood pressure get out of control.  · If you miss a dose or doses, check with your healthcare provider or pharmacist about what to do.  · Consider  buying an automatic blood pressure machine. Ask your provider for a recommendation. You can get one of these at most pharmacies.     The American Heart Association recommends the following guidelines for home blood pressure monitoring:  · Don't smoke or drink coffee for 30 minutes before taking your blood pressure.  · Go to the bathroom before the test.  · Relax for 5 minutes before taking the measurement.  · Sit with your back supported (don't sit on a couch or soft chair); keep your feet on the floor uncrossed. Place your arm on a solid flat surface (like a table) with the upper part of the arm at heart level. Place the middle of the cuff directly above the eye of the elbow. Check the monitor's instruction manual for an illustration.  · Take multiple readings. When you measure, take 2 to 3 readings one minute apart and record all of the results.  · Take your blood pressure at the same time every day, or as your healthcare provider recommends.  · Record the date, time, and blood pressure reading.  · Take the record with you to your next medical appointment. If your blood pressure monitor has a built-in memory, simply take the monitor with you to your next appointment.  · Call your provider if you have several high readings. Don't be frightened by a single high blood pressure reading, but if you get several high readings, check in with your healthcare provider.  · Note: When blood pressure reaches a systolic (top number) of 180 or higher OR diastolic (bottom number) of 110 or higher, seek emergency medical treatment.  Follow-up care  You will need to see your healthcare provider regularly. This is to check your blood pressure and to make changes to your medicines. Make a follow-up appointment as directed. Bring the record of your home blood pressure readings to the appointment.  When to seek medical advice  Call your healthcare provider right away if any of these occur:  · Blood pressure reaches a systolic (upper  number) of 180 or higher OR a diastolic (bottom number) of 110 or higher  · Chest pain or shortness of breath  · Severe headache  · Throbbing or rushing sound in the ears  · Nosebleed  · Sudden severe pain in your belly (abdomen)  · Extreme drowsiness, confusion, or fainting  · Dizziness or spinning sensation (vertigo)  · Weakness of an arm or leg or one side of the face  · You have problems speaking or seeing   Date Last Reviewed: 12/1/2016  © 2584-1112 [employer]. [address] [place] [address]. All rights reserved. This information is not intended as a substitute for professional medical care. Always follow your healthcare professional's instructions.          Treating Anxiety Disorders with Medicine  An anxiety disorder can make you feel nervous or apprehensive, even without a clear reason. In people age 65 and older, generalized anxiety disorder is one of the most commonly diagnosed anxiety disorders. Many times it occurs with depression. Certain anxiety disorders can cause intense feelings of fear or panic. You may even have physical symptoms such as a racing heartbeat, sweating, or dizziness. If you have these feelings, you dont have to suffer anymore. Treatment to help you overcome your fears will likely include therapy (also called counseling). Medicine may also be prescribed to help control your symptoms.    Medicines  Certain medicines may be prescribed to help control your symptoms. So you may feel less anxious. You may also feel able to move forward with therapy. At first, medicines and dosages may need to be adjusted to find what works best for you. Try to be patient. Tell your healthcare provider how a medicine makes you feel. This way, you can work together to find the treatment thats best for you. Keep in mind that medicines can have side effects. Talk with your provider about any side effects that are bothering you. Changing the dose or type of medicine may help. Dont  stop taking medicine on your own. That can cause symptoms to come back.  · Anti-anxiety medicine. This medicine eases symptoms and helps you relax. Your healthcare provider will explain when and how to use it. It may be prescribed for use before situations that make you anxious. You may also be told to take medicine on a regular schedule. Anti-anxiety medicine may make you feel a little sleepy or out of it. Dont drive a car or operate machinery while on this medicine, until you know how it affects you.  Caution  Never use alcohol or other drugs with anti-anxiety medicines. This could result in loss of muscular control, sedation, coma, or death. Also, use only the amount of medicine prescribed for you. If you think you may have taken too much, get emergency care right away.   · Antidepressant medicine. This kind of medicine is often used to treat anxiety, even if you arent depressed. An antidepressant helps balance out brain chemicals. This helps keep anxiety under control. This medicine is taken on a schedule. It takes a few weeks to start working. If you dont notice a change at first, you may just need more time. But if you dont notice results after the first few weeks, tell your provider.  Keep taking medicines as prescribed  Never change your dosage, share or use another person's medicine, or stop taking your medicines without talking to your healthcare provider first. Keep the following in mind:  · Some medicines must be taken on a schedule. Make this part of your daily routine. For instance, always take your pill before brushing your teeth. A pillbox can help you remember if youve taken your medicine each day.  · Medicines are often taken for 6 to 12 months. Your healthcare provider will then evaluate whether you need to stay on them. Many people who have also had therapy may no longer need medicine to manage anxiety.  · You may need to stop taking medicine slowly to give your body time to adjust. When  its time to stop, your healthcare provider will tell you more. Remember: Never stop taking your medicine without talking to your provider first.  · If symptoms return, you may need to start taking medicines again. This isnt your fault. Its just the nature of your anxiety disorder.  Special concerns  · Side effects. Medicines may cause side effects. Ask your healthcare provider or pharmacist what you can expect. They may have ideas for avoiding some side effects.  · Sexual problems. Some antidepressants can affect your desire for sex or your ability to have an orgasm. A change in dosage or medicine often solves the problem. If you have a sexual side effect that concerns you, tell your healthcare provider.  · Addiction. If youve never had a problem with drugs or alcohol, you may not have a problem with medicines used to treat anxiety disorders. But always discuss the medicines with your healthcare provider before taking them. If you have a history of addiction, you may not be able to use certain medicines used to treat anxiety disorders.  · Medicine interactions. Always check with your pharmacist before using any over-the-counter medicines, including herbal supplements.   Date Last Reviewed: 5/1/2017  © 2145-3342 [employer]. [address] [place] [address]. All rights reserved. This information is not intended as a substitute for professional medical care. Always follow your healthcare professional's instructions.              Risks, benefits, and alternatives discussed with patient, Patient verbalized understanding of discussed plan of care. Asked patient if any further questions, answered no.    Future Appointments   Date Time Provider Department Center   11/12/2020  8:00 AM [facility], APRN, FNP-C

## 2020-08-17 DIAGNOSIS — K76.9 LIVER DISEASE, UNSPECIFIED: ICD-10-CM

## 2020-08-17 DIAGNOSIS — K80.20 CALCULUS OF GALLBLADDER WITHOUT CHOLECYSTITIS WITHOUT OBSTRUCTION: ICD-10-CM

## 2020-08-19 ENCOUNTER — OFFICE VISIT (OUTPATIENT)
Dept: UROLOGY | Facility: CLINIC | Age: 45
End: 2020-08-19
Payer: MEDICAID

## 2020-08-19 ENCOUNTER — TELEPHONE (OUTPATIENT)
Dept: PRIMARY CARE CLINIC | Facility: CLINIC | Age: 45
End: 2020-08-19

## 2020-08-19 VITALS
SYSTOLIC BLOOD PRESSURE: 140 MMHG | HEART RATE: 68 BPM | WEIGHT: 304 LBS | BODY MASS INDEX: 39.01 KG/M2 | RESPIRATION RATE: 18 BRPM | DIASTOLIC BLOOD PRESSURE: 81 MMHG | HEIGHT: 74 IN

## 2020-08-19 DIAGNOSIS — R31.0 GROSS HEMATURIA: Primary | ICD-10-CM

## 2020-08-19 DIAGNOSIS — F41.9 ANXIETY: Primary | ICD-10-CM

## 2020-08-19 DIAGNOSIS — N30.01 ACUTE CYSTITIS WITH HEMATURIA: ICD-10-CM

## 2020-08-19 DIAGNOSIS — R97.20 ELEVATED PSA: ICD-10-CM

## 2020-08-19 LAB — POC RESIDUAL URINE VOLUME: 0 ML (ref 0–100)

## 2020-08-19 PROCEDURE — 99204 OFFICE O/P NEW MOD 45 MIN: CPT | Mod: S$GLB,,, | Performed by: UROLOGY

## 2020-08-19 PROCEDURE — 51798 US URINE CAPACITY MEASURE: CPT | Mod: S$GLB,,, | Performed by: UROLOGY

## 2020-08-19 PROCEDURE — 99204 PR OFFICE/OUTPT VISIT, NEW, LEVL IV, 45-59 MIN: ICD-10-PCS | Mod: S$GLB,,, | Performed by: UROLOGY

## 2020-08-19 PROCEDURE — 51798 POCT BLADDER SCAN: ICD-10-PCS | Mod: S$GLB,,, | Performed by: UROLOGY

## 2020-08-19 NOTE — LETTER
August 19, 2020      Kira Hunter, FNP-C  [address] [facility]  [place] [address]           [place] [facility]  [address] [place] [address]  Phone: [phone]  Fax: [phone]          Patient: Tray Perez   MR Number: [id number]   YOB: 1975   Date of Visit: 8/19/2020       Dear Kira Hunter:    Thank you for referring Tray Perez to me for evaluation. Attached you will find relevant portions of my assessment and plan of care.    If you have questions, please do not hesitate to call me. I look forward to following Tray Perez along with you.    Sincerely,    Jose Esqueda MD    Enclosure  CC:  No Recipients    If you would like to receive this communication electronically, please contact [email or web address] or [phone] to request more information on [employer] access.    For providers and/or their staff who would like to refer a patient to [facility], please contact us through our one-stop-shop provider referral line, [facility], at [phone].    If you feel you have received this communication in error or would no longer like to receive these types of communications, please e-mail [email or web address]

## 2020-08-19 NOTE — PROGRESS NOTES
Subjective:       Patient ID: Tray Perez is a 45 y.o. male.    Chief Complaint: Urinary Tract Infection, Hematuria, and Abdominal Pain      HPI: 45 year old male, new patient, referred for UTI with hematuria.  Patient states he has a history of UTIs and visible blood in the urine.  Patient states this history goes back about 20 years.   Patient's most recent episode was about 2 weeks ago.  Patient went to an urgent care and was treated for a UTI.  Patient had a CT which showed a 4 cm mass in the liver.  No abnormality was noted to the kidneys.  At the time of the infection patient reports pain with urination, increase frequency, decreased output, and fever.  Symptoms have since resolved.     Patient had PSA done 08/04/2020 which was 4.9.  Denies history of kidney stones.  No other urinary complaints.        Past Medical History:   Past Medical History:   Diagnosis Date    Hypertension     IBS (irritable bowel syndrome)     Urinary tract infection        Past Surgical Historical:   Past Surgical History:   Procedure Laterality Date    CARPAL TUNNEL RELEASE      ULNAR TUNNEL RELEASE          Medications:   Medication List with Changes/Refills   Current Medications    ERGOCALCIFEROL (ERGOCALCIFEROL) 50,000 UNIT CAP    Take 1 capsule (50,000 Units total) by mouth every 7 days.    LISINOPRIL 10 MG TABLET    Take 1 tablet (10 mg total) by mouth once daily.    SERTRALINE (ZOLOFT) 50 MG TABLET    Take 1 tablet (50 mg total) by mouth once daily.        Past Social History:   Social History     Socioeconomic History    Marital status: Single     Spouse name: Not on file    Number of children: Not on file    Years of education: Not on file    Highest education level: Not on file   Occupational History    Not on file   Social Needs    Financial resource strain: Not on file    Food insecurity     Worry: Not on file     Inability: Not on file    Transportation needs     Medical: Not on file     Non-medical:  Not on file   Tobacco Use    Smoking status: Never Smoker    Smokeless tobacco: Never Used   Substance and Sexual Activity    Alcohol use: Not Currently    Drug use: Yes     Types: Marijuana    Sexual activity: Not Currently   Lifestyle    Physical activity     Days per week: Not on file     Minutes per session: Not on file    Stress: Not on file   Relationships    Social connections     Talks on phone: Not on file     Gets together: Not on file     Attends Je[employer]'s witness service: Not on file     Active member of club or organization: Not on file     Attends meetings of clubs or organizations: Not on file     Relationship status: Not on file   Other Topics Concern    Not on file   Social History Narrative    Not on file       Allergies: Review of patient's allergies indicates:  No Known Allergies     Family History:   Family History   Problem Relation Age of Onset    Heart disease Father     Hypertension Father     No Known Problems Mother         Review of Systems:  Review of Systems   Constitutional: Negative for activity change and appetite change.   HENT: Negative for congestion and dental problem.    Eyes: Negative for visual disturbance.   Respiratory: Negative for chest tightness and shortness of breath.    Cardiovascular: Negative for chest pain.   Gastrointestinal: Negative for abdominal distention and abdominal pain.   Genitourinary: Positive for dysuria, frequency and hematuria. Negative for decreased urine volume, difficulty urinating, discharge, enuresis, flank pain, genital sores, penile pain, penile swelling, scrotal swelling, testicular pain and urgency.   Musculoskeletal: Negative for back pain and neck pain.   Skin: Negative for color change.   Neurological: Negative for dizziness.   Hematological: Negative for adenopathy.   Psychiatric/Behavioral: Negative for agitation, behavioral problems and confusion.       Physical Exam:  Physical Exam   Nursing note and vitals  reviewed.  Constitutional: He is oriented to person, place, and time. He appears well-developed.   HENT:   Head: Normocephalic.   Eyes: Pupils are equal, round, and reactive to light.   Neck: Normal range of motion. Neck supple.   Cardiovascular: Normal rate, regular rhythm and normal heart sounds.    Pulmonary/Chest: Effort normal and breath sounds normal.   Abdominal: Soft. Bowel sounds are normal.   Musculoskeletal: Normal range of motion.   Neurological: He is alert and oriented to person, place, and time.   Skin: Skin is warm and dry.     Psychiatric: His behavior is normal.     UA: normal  Bladder scan: 0 cc.    Assessment/Plan:   1. UTI with hematuria/Gross hematuria:  Patient had CT which showed no urological abnormality.  Will schedule patient for Cysto.    2. Elevated PSA:  Elevated PSA may be associated with UTI.  Will recheck PSA in 2-4 weeks.    Follow up to be arranged.   Problem List Items Addressed This Visit        Renal/    Elevated PSA    Overview     refer to urology         Current Assessment & Plan     Elevated PSA may be associated with UTI.  Will recheck PSA in 2-4 weeks.          Gross hematuria - Primary    Overview     Patient reports 20 year history of blood in the urine.   Most recent episode @ 2 weeks ago.         Current Assessment & Plan     No urological abnormality on CT.  Will schedule patient for cysto.         Relevant Orders    Cystoscopy    POCT Urinalysis (w/Micro Option)    POCT Bladder Scan      Other Visit Diagnoses     Acute cystitis with hematuria        Relevant Orders    Cystoscopy    POCT Urinalysis (w/Micro Option)    POCT Bladder Scan             I, Dr. Jose Esqueda have seen and personally evaluated the patient I have formulated the plan, reviewed all pertinent imaging and clinical data.  I agree with the nurse practitioner's assessment, and I have personally formulated the plan for this patient's care as described by the midlevel.  Patient's exam was normal  soft abdomen all lab work was within normal range.

## 2020-08-21 DIAGNOSIS — R16.0 LIVER MASS: Primary | ICD-10-CM

## 2020-08-21 DIAGNOSIS — K80.20 GALLSTONES: ICD-10-CM

## 2020-09-04 ENCOUNTER — PATIENT MESSAGE (OUTPATIENT)
Dept: PRIMARY CARE CLINIC | Facility: CLINIC | Age: 45
End: 2020-09-04

## 2020-09-04 DIAGNOSIS — I10 ESSENTIAL HYPERTENSION: ICD-10-CM

## 2020-09-04 DIAGNOSIS — E55.9 VITAMIN D DEFICIENCY: ICD-10-CM

## 2020-09-04 DIAGNOSIS — F41.9 ANXIETY: ICD-10-CM

## 2020-09-14 ENCOUNTER — TELEPHONE (OUTPATIENT)
Dept: PRIMARY CARE CLINIC | Facility: CLINIC | Age: 45
End: 2020-09-14
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
## 2020-09-15 RX ORDER — SERTRALINE HYDROCHLORIDE 50 MG/1
50 TABLET, FILM COATED ORAL DAILY
Qty: 30 TABLET | Refills: 2 | Status: SHIPPED | OUTPATIENT
Start: 2020-09-15 | End: 2021-03-22 | Stop reason: SDUPTHER

## 2020-09-15 RX ORDER — ERGOCALCIFEROL 1.25 MG/1
50000 CAPSULE ORAL
Qty: 12 CAPSULE | Refills: 0 | Status: SHIPPED | OUTPATIENT
Start: 2020-09-15 | End: 2021-03-22

## 2020-09-15 RX ORDER — LISINOPRIL 10 MG/1
10 TABLET ORAL DAILY
Qty: 30 TABLET | Refills: 2 | Status: SHIPPED | OUTPATIENT
Start: 2020-09-15 | End: 2021-03-22 | Stop reason: SDUPTHER

## 2020-10-21 ENCOUNTER — TELEPHONE (OUTPATIENT)
Dept: UROLOGY | Facility: CLINIC | Age: 45
End: 2020-10-21

## 2020-10-22 ENCOUNTER — TELEPHONE (OUTPATIENT)
Dept: UROLOGY | Facility: CLINIC | Age: 45
End: 2020-10-22

## 2020-10-22 NOTE — TELEPHONE ENCOUNTER
Pt is declining cysto at this time, stating he is asymptomatic and has appt. with PCP and would like to attend that appt first. Will call back to clinic if symptoms begin again.      ----- Message from Clover Roberts sent at 10/22/2020  3:03 PM CDT -----  I finally spoke to pt about cyst. He no longer wants it at this time. He will see his primary care in about 3 months and then decide if he is still having bladder problems. No symptoms any more should order for cysto be cxd?

## 2021-03-22 ENCOUNTER — OFFICE VISIT (OUTPATIENT)
Dept: PRIMARY CARE CLINIC | Facility: CLINIC | Age: 46
End: 2021-03-22
Payer: MEDICAID

## 2021-03-22 VITALS
SYSTOLIC BLOOD PRESSURE: 138 MMHG | OXYGEN SATURATION: 96 % | HEART RATE: 70 BPM | DIASTOLIC BLOOD PRESSURE: 70 MMHG | HEIGHT: 74 IN | WEIGHT: 309.38 LBS | BODY MASS INDEX: 39.7 KG/M2

## 2021-03-22 DIAGNOSIS — H91.90 HEARING LOSS, UNSPECIFIED HEARING LOSS TYPE, UNSPECIFIED LATERALITY: Primary | ICD-10-CM

## 2021-03-22 DIAGNOSIS — I10 ESSENTIAL HYPERTENSION: ICD-10-CM

## 2021-03-22 DIAGNOSIS — H61.22 IMPACTED CERUMEN OF LEFT EAR: ICD-10-CM

## 2021-03-22 DIAGNOSIS — L98.9 SKIN LESION: ICD-10-CM

## 2021-03-22 DIAGNOSIS — K21.9 GASTROESOPHAGEAL REFLUX DISEASE, UNSPECIFIED WHETHER ESOPHAGITIS PRESENT: ICD-10-CM

## 2021-03-22 DIAGNOSIS — K52.9 CHRONIC DIARRHEA: ICD-10-CM

## 2021-03-22 DIAGNOSIS — R16.0 LIVER MASS: ICD-10-CM

## 2021-03-22 DIAGNOSIS — F41.9 ANXIETY: ICD-10-CM

## 2021-03-22 DIAGNOSIS — H93.19 TINNITUS, UNSPECIFIED LATERALITY: ICD-10-CM

## 2021-03-22 PROCEDURE — 99214 PR OFFICE/OUTPT VISIT, EST, LEVL IV, 30-39 MIN: ICD-10-PCS | Mod: S$GLB,,, | Performed by: INTERNAL MEDICINE

## 2021-03-22 PROCEDURE — 99214 OFFICE O/P EST MOD 30 MIN: CPT | Mod: S$GLB,,, | Performed by: INTERNAL MEDICINE

## 2021-03-22 RX ORDER — PANTOPRAZOLE SODIUM 20 MG/1
20 TABLET, DELAYED RELEASE ORAL DAILY
Qty: 30 TABLET | Refills: 11 | Status: SHIPPED | OUTPATIENT
Start: 2021-03-22 | End: 2022-09-08 | Stop reason: ALTCHOICE

## 2021-03-22 RX ORDER — SERTRALINE HYDROCHLORIDE 50 MG/1
50 TABLET, FILM COATED ORAL DAILY
Qty: 90 TABLET | Refills: 3 | Status: SHIPPED | OUTPATIENT
Start: 2021-03-22 | End: 2022-09-08 | Stop reason: SINTOL

## 2021-03-22 RX ORDER — LISINOPRIL 10 MG/1
10 TABLET ORAL DAILY
Qty: 90 TABLET | Refills: 3 | Status: SHIPPED | OUTPATIENT
Start: 2021-03-22 | End: 2022-09-08 | Stop reason: SDUPTHER

## 2021-03-23 ENCOUNTER — TELEPHONE (OUTPATIENT)
Dept: PRIMARY CARE CLINIC | Facility: CLINIC | Age: 46
End: 2021-03-23

## 2021-03-24 ENCOUNTER — TELEPHONE (OUTPATIENT)
Dept: PRIMARY CARE CLINIC | Facility: CLINIC | Age: 46
End: 2021-03-24

## 2021-04-07 NOTE — PROGRESS NOTES
Subjective:       Patient ID: Tray Perez is a 45 y.o. male.    Chief Complaint: Establish Care and Mental Health Problem    HPI:    Presents to [facility] care with new PCP. Previous PCP Dr. Rosen retired.     C/o RUQ pain for several months. Went to [facility] ER. CT scan of abd showed mass on liver >4cm, blood in urine, and was told to f/u with PCP. Reports also had UTI and was given abx 3 weeks ago. C/o continued RUQ abd pain, N/V/D, painful urination, and anxious about everything going on. Denies SI/HI. Wasn't sure where to go from here. Lives with elderly aunt whom he cares for, barely leaves the house, has elderly parents, and is scared of COVID, does not want to spread to family. Very overwhelmed and anxious from this pandemic.     HTN - used to take Lisinopril 10mg 1 PO daily, has been out of med for sometime, needs RF. Tolerated well previously w/o s/e.     Reports has gained several pounds over the past 3 months, not eating healthy diet or exercising.             Past Medical History:   Diagnosis Date    Hypertension     IBS (irritable bowel syndrome)        Past Surgical History:   Procedure Laterality Date    CARPAL TUNNEL RELEASE      ULNAR TUNNEL RELEASE         Family History   Problem Relation Age of Onset    Heart disease Father     Hypertension Father        Social History     Tobacco Use    Smoking status: Never Smoker    Smokeless tobacco: Never Used   Substance Use Topics    Alcohol use: Not Currently    Drug use: Never       Patient Active Problem List   Diagnosis    Essential hypertension    Anxiety         There is no immunization history on file for this patient.        Review of Systems   Constitutional: Positive for fatigue. Negative for activity change, chills, diaphoresis and fever.   HENT: Positive for ear pain. Negative for mouth sores, nosebleeds and trouble swallowing.    Eyes: Negative for pain and visual disturbance.   Respiratory: Negative for cough, chest  "tightness and shortness of breath.    Cardiovascular: Positive for chest pain and palpitations. Negative for leg swelling.   Gastrointestinal: Positive for abdominal distention, abdominal pain, diarrhea, nausea and vomiting. Negative for blood in stool.   Endocrine: Negative for polydipsia and polyphagia.   Genitourinary: Positive for dysuria, flank pain and frequency.   Musculoskeletal: Negative for gait problem, joint swelling, neck pain and neck stiffness.   Skin: Negative for color change, pallor and rash.   Neurological: Positive for dizziness. Negative for syncope and light-headedness.   Hematological: Positive for adenopathy. Does not bruise/bleed easily.   Psychiatric/Behavioral: Negative for confusion, hallucinations and suicidal ideas. The patient is nervous/anxious.      Objective:     Vitals:    08/04/20 0819   BP: 138/88   BP Location: Left arm   Patient Position: Sitting   BP Method: Large (Manual)   Pulse: 69   Resp: 18   SpO2: 97%   Weight: (!) 138.2 kg (304 lb 9.6 oz)   Height: 6' 2" (1.88 m)       Physical Exam  Vitals signs and nursing note reviewed.   Constitutional:       General: He is not in acute distress.     Appearance: He is well-developed. He is not diaphoretic.   HENT:      Head: Normocephalic and atraumatic.      Right Ear: A middle ear effusion is present.      Left Ear: A middle ear effusion is present.      Nose: Nose normal.      Mouth/Throat:      Lips: Pink.      Mouth: Mucous membranes are moist. Mucous membranes are not pale, not dry and not cyanotic.      Tongue: No lesions.      Pharynx: Oropharynx is clear.      Tonsils: No tonsillar exudate.   Eyes:      Conjunctiva/sclera: Conjunctivae normal.   Neck:      Musculoskeletal: Normal range of motion and neck supple.      Thyroid: No thyromegaly.      Vascular: No JVD.      Trachea: No tracheal deviation.   Cardiovascular:      Rate and Rhythm: Normal rate and regular rhythm.      Pulses: Normal pulses.      Heart sounds: Normal " heart sounds. No murmur.   Pulmonary:      Effort: Pulmonary effort is normal. No respiratory distress.      Breath sounds: Normal breath sounds. No stridor. No wheezing or rales.   Abdominal:      General: Bowel sounds are normal. There is no distension.      Palpations: Abdomen is soft.      Tenderness: There is abdominal tenderness in the right upper quadrant. There is no right CVA tenderness, left CVA tenderness, guarding or rebound. Positive signs include Dougherty's sign. Negative signs include Rovsing's sign, McBurney's sign, psoas sign and obturator sign.   Musculoskeletal: Normal range of motion.         General: No swelling.   Lymphadenopathy:      Head:      Left side of head: Tonsillar and preauricular adenopathy present.      Cervical: Cervical adenopathy present.   Skin:     General: Skin is warm and dry.      Capillary Refill: Capillary refill takes less than 2 seconds.      Coloration: Skin is not jaundiced.      Findings: No bruising.   Neurological:      General: No focal deficit present.      Mental Status: He is alert and oriented to person, place, and time.   Psychiatric:         Attention and Perception: Attention normal.         Mood and Affect: Mood is anxious and depressed. Affect is tearful.         Speech: Speech normal.         Behavior: Behavior normal. Behavior is cooperative.         Thought Content: Thought content does not include homicidal or suicidal ideation. Thought content does not include homicidal or suicidal plan.         No results found for any previous visit.     Reviewed and discussed ER visit notes, labs and imaging results.     Assessment:      1. Essential hypertension    2. Acute cystitis with hematuria    3. Long term use of drug    4. Insulin resistance    5. Vitamin D deficiency    6. Vitamin B12 deficiency    7. Thyroid disease    8. Screening PSA (prostate specific antigen)    9. Encounter for screening for HIV    10. Need for hepatitis C screening test    11. Pure  hypercholesterolemia    12. Anxiety    13. BMI 39.0-39.9,adult    14. Liver mass    15. Malignant neoplasm of lymphoid, hematopoietic and related tissue, unspecified    16. RUQ pain    17. Non-intractable vomiting with nausea, unspecified vomiting type          Plan:     Essential hypertension  Comments:  restart Lisinopril as directed, goal b/p <140/90  Orders:  -     CBC auto differential; Future; Expected date: 08/04/2020  -     Comprehensive metabolic panel; Future; Expected date: 08/04/2020  -     Urinalysis, Reflex to Urine Culture Urine, Clean Catch; Future  -     Microalbumin/creatinine urine ratio; Future; Expected date: 08/04/2020  -     lisinopriL 10 MG tablet; Take 1 tablet (10 mg total) by mouth once daily.  Dispense: 30 tablet; Refill: 2    Acute cystitis with hematuria  Comments:  recheck UA - if still abnormal will refer to Urology  Orders:  -     Urinalysis, Reflex to Urine Culture Urine, Clean Catch; Future    Long term use of drug  -     Drugs of Abuse Screen, Blood; Future; Expected date: 08/04/2020    Insulin resistance  -     Hemoglobin A1C; Future; Expected date: 08/04/2020    Vitamin D deficiency  -     Vitamin D; Future; Expected date: 08/04/2020    Vitamin B12 deficiency  -     Vitamin B12/folate, serum panel; Future; Expected date: 08/04/2020    Thyroid disease  -     T4, free; Future; Expected date: 08/04/2020  -     TSH; Future; Expected date: 08/04/2020  -     T3, free; Future; Expected date: 08/04/2020    Screening PSA (prostate specific antigen)  -     PSA, Screening; Future; Expected date: 08/04/2020    Encounter for screening for HIV  -     HIV 1/2 Ag/Ab (4th Gen); Future; Expected date: 08/04/2020    Need for hepatitis C screening test  -     Hepatitis C Antibody; Future; Expected date: 08/04/2020    Pure hypercholesterolemia  -     Lipid Panel; Future; Expected date: 08/04/2020    Anxiety  Comments:  start on med as directed, if not helping or s/s worsen - will refer to [facility]  Orders:  -     sertraline (ZOLOFT) 50 MG tablet; Take 1 tablet (50 mg total) by mouth once daily.  Dispense: 30 tablet; Refill: 0    BMI 39.0-39.9,adult  Comments:  discussed healthy diet and exercise regimen    Liver mass  Comments:  will check labs and order CT scan of abd with contrast for further evaluation - will refer to specialist based on results  Orders:  -     CT Abdomen Pelvis With Contrast; Future; Expected date: 08/04/2020    Malignant neoplasm of lymphoid, hematopoietic and related tissue, unspecified  -     CT Abdomen Pelvis With Contrast; Future; Expected date: 08/04/2020    RUQ pain  -     CT Abdomen Pelvis With Contrast; Future; Expected date: 08/04/2020    Non-intractable vomiting with nausea, unspecified vomiting type  -     CT Abdomen Pelvis With Contrast; Future; Expected date: 08/04/2020       Will further develop POC based on results at next encounter.     Current Outpatient Medications   Medication Sig Dispense Refill    lisinopriL 10 MG tablet Take 1 tablet (10 mg total) by mouth once daily. 30 tablet 2    sertraline (ZOLOFT) 50 MG tablet Take 1 tablet (50 mg total) by mouth once daily. 30 tablet 0     No current facility-administered medications for this visit.        There are no discontinued medications.    Health Maintenance   Topic Date Due    Hepatitis C Screening  1975    Lipid Panel  1975    TETANUS VACCINE  08/04/2030       Patient Instructions     Continue medications as directed.     Continue to follow up with specialists as directed.    Report to nearest ER or call 911 if you begin to have worsening symptoms, difficulty breathing, turning blue, chest pain, B/P < 80/60 or >170/100, palpitations, syncope, extreme weakness, severe abdominal pain, or severe H/A. Patient verbalized understanding.     RTC in 1 week to review and discuss results.      Established High Blood Pressure    High blood pressure (hypertension) is a chronic disease. Often, healthcare  providers dont know what causes it. But it can be caused by certain health conditions and medicines.  If you have high blood pressure, you may not have any symptoms. If you do have symptoms, they may include headache, dizziness, changes in your vision, chest pain, and shortness of breath. But even without symptoms, high blood pressure thats not treated raises your risk for heart attack and stroke. High blood pressure is a serious health risk and shouldnt be ignored.  A blood pressure reading is made up of two numbers: a higher number over a lower number. The top number is the systolic pressure. The bottom number is the diastolic pressure. A normal blood pressure is a systolic pressure of  less than 120 over a diastolic pressure of less than 80. You will see your blood pressure readings written together. For example, a person with a systolic pressure of 188 and a diastolic pressure of 78 will have 118/78 written in the medical record.  High blood pressure is when either the top number is 140 or higher, or the bottom number is 90 or higher. This must be the result when taking your blood pressure a number of times. The blood pressures between normal and high are called prehypertension.  Home care  If you have high blood pressure, you should do what is listed below to lower your blood pressure. If you are taking medicines for high blood pressure, these methods may reduce or end your need for medicines in the future.  · Begin a weight-loss program if you are overweight.  · Cut back on how much salt you get in your diet. Heres how to do this:  ¨ Dont eat foods that have a lot of salt. These include olives, pickles, smoked meats, and salted potato chips.  ¨ Dont add salt to your food at the table.  ¨ Use only small amounts of salt when cooking.  · Start an exercise program. Talk with your healthcare provider about the type of exercise program that would be best for you. It doesn't have to be hard. Even brisk walking  for 20 minutes 3 times a week is a good form of exercise.  · Dont take medicines that stimulate the heart. This includes many over-the-counter cold and sinus decongestant pills and sprays, as well as diet pills. Check the warnings about hypertension on the label. Before buying any over-the-counter medicines or supplements, always ask the pharmacist about the product's potential interaction with your high blood pressure and your high blood pressure medicines.  · Stimulants such as amphetamine or cocaine could be deadly for someone with high blood pressure. Never take these.  · Limit how much caffeine you get in your diet. Switch to caffeine-free products.  · Stop smoking. If you are a long-time smoker, this can be hard. Talk to your healthcare provider about medicines and nicotine replacement options to help you. Also, enroll in a stop-smoking program to make it more likely that you will quit for good.  · Learn how to handle stress. This is an important part of any program to lower blood pressure. Learn about relaxation methods like meditation, yoga, or biofeedback.  · If your provider prescribed medicines, take them exactly as directed. Missing doses may cause your blood pressure get out of control.  · If you miss a dose or doses, check with your healthcare provider or pharmacist about what to do.  · Consider buying an automatic blood pressure machine. Ask your provider for a recommendation. You can get one of these at most pharmacies.     The American Heart Association recommends the following guidelines for home blood pressure monitoring:  · Don't smoke or drink coffee for 30 minutes before taking your blood pressure.  · Go to the bathroom before the test.  · Relax for 5 minutes before taking the measurement.  · Sit with your back supported (don't sit on a couch or soft chair); keep your feet on the floor uncrossed. Place your arm on a solid flat surface (like a table) with the upper part of the arm at heart  level. Place the middle of the cuff directly above the eye of the elbow. Check the monitor's instruction manual for an illustration.  · Take multiple readings. When you measure, take 2 to 3 readings one minute apart and record all of the results.  · Take your blood pressure at the same time every day, or as your healthcare provider recommends.  · Record the date, time, and blood pressure reading.  · Take the record with you to your next medical appointment. If your blood pressure monitor has a built-in memory, simply take the monitor with you to your next appointment.  · Call your provider if you have several high readings. Don't be frightened by a single high blood pressure reading, but if you get several high readings, check in with your healthcare provider.  · Note: When blood pressure reaches a systolic (top number) of 180 or higher OR diastolic (bottom number) of 110 or higher, seek emergency medical treatment.  Follow-up care  You will need to see your healthcare provider regularly. This is to check your blood pressure and to make changes to your medicines. Make a follow-up appointment as directed. Bring the record of your home blood pressure readings to the appointment.  When to seek medical advice  Call your healthcare provider right away if any of these occur:  · Blood pressure reaches a systolic (upper number) of 180 or higher OR a diastolic (bottom number) of 110 or higher  · Chest pain or shortness of breath  · Severe headache  · Throbbing or rushing sound in the ears  · Nosebleed  · Sudden severe pain in your belly (abdomen)  · Extreme drowsiness, confusion, or fainting  · Dizziness or spinning sensation (vertigo)  · Weakness of an arm or leg or one side of the face  · You have problems speaking or seeing   Date Last Reviewed: 12/1/2016  © 7024-6112 [employer]. [address] [place] [address], [place] [address]. All rights reserved. This information is not intended as a substitute for professional  medical care. Always follow your healthcare professional's instructions.          Treating Anxiety Disorders with Medicine  An anxiety disorder can make you feel nervous or apprehensive, even without a clear reason. In people age 65 and older, generalized anxiety disorder is one of the most commonly diagnosed anxiety disorders. Many times it occurs with depression. Certain anxiety disorders can cause intense feelings of fear or panic. You may even have physical symptoms such as a racing heartbeat, sweating, or dizziness. If you have these feelings, you dont have to suffer anymore. Treatment to help you overcome your fears will likely include therapy (also called counseling). Medicine may also be prescribed to help control your symptoms.    Medicines  Certain medicines may be prescribed to help control your symptoms. So you may feel less anxious. You may also feel able to move forward with therapy. At first, medicines and dosages may need to be adjusted to find what works best for you. Try to be patient. Tell your healthcare provider how a medicine makes you feel. This way, you can work together to find the treatment thats best for you. Keep in mind that medicines can have side effects. Talk with your provider about any side effects that are bothering you. Changing the dose or type of medicine may help. Dont stop taking medicine on your own. That can cause symptoms to come back.  · Anti-anxiety medicine. This medicine eases symptoms and helps you relax. Your healthcare provider will explain when and how to use it. It may be prescribed for use before situations that make you anxious. You may also be told to take medicine on a regular schedule. Anti-anxiety medicine may make you feel a little sleepy or out of it. Dont drive a car or operate machinery while on this medicine, until you know how it affects you.  Caution  Never use alcohol or other drugs with anti-anxiety medicines. This could result in loss of  muscular control, sedation, coma, or death. Also, use only the amount of medicine prescribed for you. If you think you may have taken too much, get emergency care right away.   · Antidepressant medicine. This kind of medicine is often used to treat anxiety, even if you arent depressed. An antidepressant helps balance out brain chemicals. This helps keep anxiety under control. This medicine is taken on a schedule. It takes a few weeks to start working. If you dont notice a change at first, you may just need more time. But if you dont notice results after the first few weeks, tell your provider.  Keep taking medicines as prescribed  Never change your dosage, share or use another person's medicine, or stop taking your medicines without talking to your healthcare provider first. Keep the following in mind:  · Some medicines must be taken on a schedule. Make this part of your daily routine. For instance, always take your pill before brushing your teeth. A pillbox can help you remember if youve taken your medicine each day.  · Medicines are often taken for 6 to 12 months. Your healthcare provider will then evaluate whether you need to stay on them. Many people who have also had therapy may no longer need medicine to manage anxiety.  · You may need to stop taking medicine slowly to give your body time to adjust. When its time to stop, your healthcare provider will tell you more. Remember: Never stop taking your medicine without talking to your provider first.  · If symptoms return, you may need to start taking medicines again. This isnt your fault. Its just the nature of your anxiety disorder.  Special concerns  · Side effects. Medicines may cause side effects. Ask your healthcare provider or pharmacist what you can expect. They may have ideas for avoiding some side effects.  · Sexual problems. Some antidepressants can affect your desire for sex or your ability to have an orgasm. A change in dosage or medicine often  solves the problem. If you have a sexual side effect that concerns you, tell your healthcare provider.  · Addiction. If youve never had a problem with drugs or alcohol, you may not have a problem with medicines used to treat anxiety disorders. But always discuss the medicines with your healthcare provider before taking them. If you have a history of addiction, you may not be able to use certain medicines used to treat anxiety disorders.  · Medicine interactions. Always check with your pharmacist before using any over-the-counter medicines, including herbal supplements.   Date Last Reviewed: 5/1/2017 © 2000-2017 [employer]. [address] [place] [address]. All rights reserved. This information is not intended as a substitute for professional medical care. Always follow your healthcare professional's instructions.          Weight Management: Overcoming Your Barriers    You may have many reasons why youre not ready to lose weight. You may not feel you have the time or the skills. You may be afraid of losing weight and gaining it back again. Well, you can lose weight. And you can keep the weight off, if you make changes slowly and stick with them. Remember that you may never find the perfect time to lose weight. Decide that the right time to be healthier is now.  Common barriers  Barrier 1: I dont want to deny myself.  Barrier Buster: You dont have to! Moderation is the key:  · Watch portion sizes and know when you're eating more than one serving.  · Plan to ask for a doggy bag when you eat out.  · Have just one.  · Choose lower-fat and lower-calorie versions of your favorites.  · Use a small plate instead of a normal-sized plate.   Barrier 2: I lost weight before but I gained it right back.  Barrier Buster: Make this time different:  · List what worked and didnt work last time and what you can try this time.  · Choose changes that you are willing to stick with.  · Work exercise into your  weight-loss plan.  · Be realistic about what is possible. Your plan has to fit into your life in a balanced way that works for you.   Barrier 3: I dont have the time to be active.  Barrier Buster: It takes just a few minutes a day!  · Be active with a pet or the kids.  · Block off activity time in your schedule.  · Borrow some time that you usually spend watching TV.  · You are too important not to take time to exercise--it is your life!   Feel good about yourself  Do you eat more because you feel bad about yourself, then feel even worse as you gain weight? This is a vicious cycle. Breaking this cycle is not easy. You may need group support or counseling. Always remember that you are a valuable person, no matter what size or shape you are.  Do you have a health problem? If so, dont use it as an excuse for not losing weight. Ask your healthcare provider or dietitian about methods to lose weight that are safe for you. For example, even if you have severe arthritis, it may be easier for you to exercise in a pool. Get advice from a .    Date Last Reviewed: 2/2/2016  © 8079-0045 [employer]. [address] [place] [address], [place] [address]. All rights reserved. This information is not intended as a substitute for professional medical care. Always follow your healthcare professional's instructions.              Risks, benefits, and alternatives discussed with patient, Patient verbalized understanding of discussed plan of care. Asked patient if any further questions, answered no.    Future Appointments   Date Time Provider Department Center   8/12/2020  8:20 AM [facility]     Greater than 50% time spent educating patient on plan of care, medications, treatment options, testings, and prevention; > 40min/50min.           Kira Hunter DNP, APRN, KARRIE-[facility]   [Dyspnea on Exertion] : dyspnea on exertion [Joint Pain] : joint pain [Negative] : Heme/Lymph [Shortness Of Breath] : no shortness of breath [Wheezing] : no wheezing [Cough] : no cough [Joint Stiffness] : no joint stiffness [Muscle Pain] : no muscle pain [Muscle Weakness] : no muscle weakness [Back Pain] : no back pain [Joint Swelling] : no joint swelling

## 2021-11-02 ENCOUNTER — TELEPHONE (OUTPATIENT)
Dept: PRIMARY CARE CLINIC | Facility: CLINIC | Age: 46
End: 2021-11-02
Payer: MEDICAID

## 2022-03-22 ENCOUNTER — PATIENT MESSAGE (OUTPATIENT)
Dept: ADMINISTRATIVE | Facility: HOSPITAL | Age: 47
End: 2022-03-22
Payer: MEDICAID

## 2022-04-11 ENCOUNTER — HISTORICAL (OUTPATIENT)
Dept: ADMINISTRATIVE | Facility: HOSPITAL | Age: 47
End: 2022-04-11
Payer: MEDICAID

## 2022-04-25 VITALS
SYSTOLIC BLOOD PRESSURE: 134 MMHG | DIASTOLIC BLOOD PRESSURE: 81 MMHG | BODY MASS INDEX: 39.27 KG/M2 | HEIGHT: 74 IN | WEIGHT: 306 LBS

## 2022-04-30 NOTE — H&P
Patient:   Tray Perez             MRN: [id number]            FIN: [id number]               Age:   44 years     Sex:  Male     :  1975   Associated Diagnoses:   None   Author:   Ricardo Flower MD      Chief Complaint F/U bilateral CTS and cyst on Lt wrist.   History of Present Illness This is a 44-year-old left-hand-dominant male who returns to clinic today to meet before his scheduled surgery on October 10. He has been scheduled by the previous team for left cubital tunnel, carpal tunnel release and excision of a left dorsal hand mass. EMG confirms these findings and he has failed nonoperative treatment. He has his preoperative appointment later today. He denies any change in his symptoms or history since she was last seen in clinic.   Review of Systems denies any constitutional symptoms like weight loss, fevers chills, night sweats.   Physical Exam   Vitals & Measurements HT: 187 cm WT: 142.7 kg BMI: 40.81 Gen: NAD, A&Ox3  Cardiac: RRR by PP  Pulm: non-labored WOB  Abd: soft, nt, nd  Examination of the bilateral upper extremities reveals no major thenar or hyperthenar atrophy  There is a tender, firm marble sized mass over the base of his third metacarpal. This is slightly mobile and feels more firm than a typical ganglion. It does not appear to move with the underlying extensor tendon. It does not have a positive Tinel's. Doesn't transilluminate.    He has a positive Nirmal's and Tinel's at the wrist bilaterally. He has a positive Tinel's at the elbow on the left.     ain/pin/u motor intact  silt m/u/r  2+ RP Assessment/Plan 1. Cubital tunnel syndrome on left G56.22   2. Mass of left wrist R22.32   3. Bilateral carpal tunnel syndrome G56.03   This is a 44-year-old left-hand dominant male with left carpal tunnel and cubital tunnel syndrome. He also has a left dorsal wrist mass. He's been consented and prepped for left cubital and carpal tunnel release as well as marginal excision of his left  wrist mass. Surgery is been scheduled with Dr. Acosta for October 10.

## 2022-04-30 NOTE — OP NOTE
DATE OF SURGERY:    10/10/2019    SURGEON:  Bernard Acosta MD    attending physician:  Dr. JAKE Bell MD    PREOPERATIVE DIAGNOSES:    1. Left cubital tunnel at the elbow.    2. Carpal tunnel syndrome in the palm.    3. Left dorsal hand mass.    INDICATIONS FOR PROCEDURE:  Mr. Perez is a 44-year-old male with cubital tunnel syndrome, as well as carpal tunnel syndrome with a dorsal hand mass.  He presents for operative treatment.    ANESTHESIA:  MAC, local block in the axilla.    COMPLICATIONS:  None.    PROCEDURE IN DETAIL:  The patient was placed under an axillary block and given MAC anesthesia, prepped and draped in usual sterile fashion.  Esmarch was used to exsanguinate the left upper extremity.  Tourniquet was inflated to 250 mmHg.  We first began making an incision over the cubital tunnel using 15-blade scalpel.  Bovie cautery was used to dissect down through subcutaneous tissue.  The deep fascia was then opened using tenotomies.  The ulnar nerve was identified in entirety of its course.  We released the ulnar nerve proximally and distally through the heads of the FCU tendon, as well as all the ligamentous attachments of Langley.  After this was completed, the ulnar nerve was transposed over the epicondyle and the subcutaneous placement was confirmed and sutured down using interrupted 3-0 Vicryl.  After this was completed, we performed an incision over the transverse carpal ligament using a 15-blade scalpel in the palm.  The transverse carpal ligament was incised using a 15 blade and then the ligament was released in its entirety using tenotomy scissors.  After this was completed, we made a longitudinal incision over the dorsal hand mass.  This was freed up using tenotomy scissors in its entirety and this was sent for pathology.  This appeared grossly to be a giant cell tumor, approximately 1 cm in greatest dimension.  After this was completed, the skin was closed using interrupted 3-0  Vicryl followed by a running 3-0 Monocryl on the elbow and then the palm was closed using interrupted 3-0 Prolene in the dorsal hand mass.  Skin was     closed using interrupted 3-0 Prolene.  Sterile dressing was applied.  Tourniquet was released.  Fingers were pink at end of the case.  No complications. I was scrubbed and present for the entire procedure.        ______________________________  MD MARIAN Miller/ANITRA  DD:  10/10/2019  Time:  09:07AM  DT:  10/10/2019  Time:  09:22AM  Job #:  [id number]

## 2022-06-29 ENCOUNTER — PATIENT OUTREACH (OUTPATIENT)
Dept: ADMINISTRATIVE | Facility: HOSPITAL | Age: 47
End: 2022-06-29
Payer: MEDICAID

## 2022-06-29 NOTE — PROGRESS NOTES
Working HTN gap report for pts not seen in the past 12 months or longer. Pt does not monitor his BP at home. Has appt with PCP on 9/8/2022

## 2022-09-08 ENCOUNTER — OFFICE VISIT (OUTPATIENT)
Dept: PRIMARY CARE CLINIC | Facility: CLINIC | Age: 47
End: 2022-09-08
Payer: MEDICAID

## 2022-09-08 VITALS
WEIGHT: 303 LBS | SYSTOLIC BLOOD PRESSURE: 147 MMHG | OXYGEN SATURATION: 99 % | HEIGHT: 74 IN | BODY MASS INDEX: 38.89 KG/M2 | DIASTOLIC BLOOD PRESSURE: 89 MMHG | HEART RATE: 64 BPM

## 2022-09-08 DIAGNOSIS — Z12.11 ENCOUNTER FOR SCREENING COLONOSCOPY: ICD-10-CM

## 2022-09-08 DIAGNOSIS — R16.0 LIVER MASS: ICD-10-CM

## 2022-09-08 DIAGNOSIS — F32.A DEPRESSION, UNSPECIFIED DEPRESSION TYPE: Primary | ICD-10-CM

## 2022-09-08 DIAGNOSIS — I10 ESSENTIAL HYPERTENSION: ICD-10-CM

## 2022-09-08 PROCEDURE — 3008F PR BODY MASS INDEX (BMI) DOCUMENTED: ICD-10-PCS | Mod: CPTII,S$GLB,, | Performed by: INTERNAL MEDICINE

## 2022-09-08 PROCEDURE — 4010F ACE/ARB THERAPY RXD/TAKEN: CPT | Mod: CPTII,S$GLB,, | Performed by: INTERNAL MEDICINE

## 2022-09-08 PROCEDURE — 3079F DIAST BP 80-89 MM HG: CPT | Mod: CPTII,S$GLB,, | Performed by: INTERNAL MEDICINE

## 2022-09-08 PROCEDURE — 99214 PR OFFICE/OUTPT VISIT, EST, LEVL IV, 30-39 MIN: ICD-10-PCS | Mod: S$GLB,,, | Performed by: INTERNAL MEDICINE

## 2022-09-08 PROCEDURE — 4010F PR ACE/ARB THEARPY RXD/TAKEN: ICD-10-PCS | Mod: CPTII,S$GLB,, | Performed by: INTERNAL MEDICINE

## 2022-09-08 PROCEDURE — 99214 OFFICE O/P EST MOD 30 MIN: CPT | Mod: S$GLB,,, | Performed by: INTERNAL MEDICINE

## 2022-09-08 PROCEDURE — 1159F MED LIST DOCD IN RCRD: CPT | Mod: CPTII,S$GLB,, | Performed by: INTERNAL MEDICINE

## 2022-09-08 PROCEDURE — 3008F BODY MASS INDEX DOCD: CPT | Mod: CPTII,S$GLB,, | Performed by: INTERNAL MEDICINE

## 2022-09-08 PROCEDURE — 1159F PR MEDICATION LIST DOCUMENTED IN MEDICAL RECORD: ICD-10-PCS | Mod: CPTII,S$GLB,, | Performed by: INTERNAL MEDICINE

## 2022-09-08 PROCEDURE — 3077F PR MOST RECENT SYSTOLIC BLOOD PRESSURE >= 140 MM HG: ICD-10-PCS | Mod: CPTII,S$GLB,, | Performed by: INTERNAL MEDICINE

## 2022-09-08 PROCEDURE — 3079F PR MOST RECENT DIASTOLIC BLOOD PRESSURE 80-89 MM HG: ICD-10-PCS | Mod: CPTII,S$GLB,, | Performed by: INTERNAL MEDICINE

## 2022-09-08 PROCEDURE — 3077F SYST BP >= 140 MM HG: CPT | Mod: CPTII,S$GLB,, | Performed by: INTERNAL MEDICINE

## 2022-09-08 RX ORDER — CITALOPRAM 10 MG/1
10 TABLET ORAL DAILY
Qty: 30 TABLET | Refills: 11 | Status: SHIPPED | OUTPATIENT
Start: 2022-09-08 | End: 2023-09-08

## 2022-09-08 RX ORDER — LISINOPRIL 10 MG/1
10 TABLET ORAL DAILY
Qty: 90 TABLET | Refills: 3 | Status: SHIPPED | OUTPATIENT
Start: 2022-09-08 | End: 2023-08-29

## 2022-09-08 NOTE — PROGRESS NOTES
"Subjective:      Patient ID: Tray Perez is a 47 y.o. male.    Chief Complaint: Establish Care (Has been off of his lisinopril since he last saw you. I advised him he needs to see you at least once a year to continue with his BP med. He also states Dr Mcginnis's office called him for an updated EGD/colonoscopy but wanted to talk to you first. ), Hospital Follow Up (The patient went to the ER at [facility] for dx of anxiety panic attack. Report under media. ), and Depression (He states the zoloft did not work for him. He initially declined the depression questionnaire. Now I am thinking of the questions and documenting.  "Made me crazy."  Always depressed; has issues with not sleeping; over eating; no thoughts of hurting self; does not read or watch news; sleeps all the time; He does take care of his aunt at home and lives with her. )    HPI    Patient here stating he wants to re establish care. He was seen about a year ago. I had reviewed his CT scan etc which mentioned a liver mass and I had sent a referral to IR. Patient states there was a death in the family so he didn't pursue the biopsy.   He reports depression but no SI/HI  He apparently has a GI physician Dr Villalobos and is scheduled for a f/u EGD for [facility] but states he wanted to go through me? Perhaps he needs a referral patient wasn't sure       Review of Systems   Constitutional:  Negative for chills and fever.   Respiratory:  Negative for cough, shortness of breath and wheezing.    Cardiovascular:  Negative for chest pain, palpitations and leg swelling.   Gastrointestinal:  Positive for blood in stool. Negative for abdominal pain, constipation, diarrhea, nausea and vomiting.   Genitourinary:  Negative for dysuria, frequency and urgency.   Musculoskeletal:  Negative for falls.   Skin:  Negative for rash.   Neurological:  Negative for dizziness and headaches.   Psychiatric/Behavioral:  Positive for depression. Negative for hallucinations and " "suicidal ideas.    Objective:     Physical Exam  Vitals reviewed.   Constitutional:       Appearance: Normal appearance. He is obese.   HENT:      Head: Normocephalic.   Eyes:      Extraocular Movements: Extraocular movements intact.      Conjunctiva/sclera: Conjunctivae normal.      Pupils: Pupils are equal, round, and reactive to light.   Cardiovascular:      Rate and Rhythm: Normal rate and regular rhythm.   Pulmonary:      Effort: Pulmonary effort is normal.      Breath sounds: Normal breath sounds.   Abdominal:      General: Bowel sounds are normal.   Musculoskeletal:      Right lower leg: No edema.      Left lower leg: No edema.   Skin:     General: Skin is warm.      Capillary Refill: Capillary refill takes less than 2 seconds.   Neurological:      General: No focal deficit present.      Mental Status: He is alert and oriented to person, place, and time.   Psychiatric:         Mood and Affect: Mood normal.     BP (!) 147/89 (BP Location: Right arm, Patient Position: Sitting, BP Method: X-Large (Automatic))   Pulse 64   Ht 6' 2" (1.88 m)   Wt (!) 137.4 kg (303 lb)   SpO2 99%   BMI 38.90 kg/m²     Assessment:       ICD-10-CM ICD-9-CM   1. Depression, unspecified depression type  F32.A 311   2. Liver mass  R16.0 573.8   3. Encounter for screening colonoscopy  Z12.11 V76.51   4. Essential hypertension  I10 401.9       Plan:     Medication List with Changes/Refills   New Medications    CITALOPRAM (CELEXA) 10 MG TABLET    Take 1 tablet (10 mg total) by mouth once daily.   Changed and/or Refilled Medications    Modified Medication Previous Medication    LISINOPRIL 10 MG TABLET lisinopriL 10 MG tablet       Take 1 tablet (10 mg total) by mouth once daily.    Take 1 tablet (10 mg total) by mouth once daily.   Discontinued Medications    PANTOPRAZOLE (PROTONIX) 20 MG TABLET    Take 1 tablet (20 mg total) by mouth once daily.    SERTRALINE (ZOLOFT) 50 MG TABLET    Take 1 tablet (50 mg total) by mouth once daily.    "     Depression, unspecified depression type  -     Ambulatory referral/consult to Psychiatry; Future; Expected date: 09/15/2022  -     citalopram (CELEXA) 10 MG tablet; Take 1 tablet (10 mg total) by mouth once daily.  Dispense: 30 tablet; Refill: 11    Liver mass  -     US Abdomen Limited; Future; Expected date: 09/08/2022  -     US Biopsy Liver by Radiologist (xpd); Future; Expected date: 09/08/2022    Encounter for screening colonoscopy  -     Ambulatory referral/consult to Gastroenterology; Future; Expected date: 09/15/2022    Essential hypertension  Comments:  restart Lisinopril as directed, goal b/p <140/90  Orders:  -     lisinopriL 10 MG tablet; Take 1 tablet (10 mg total) by mouth once daily.  Dispense: 90 tablet; Refill: 3           Future Appointments   Date Time Provider Department Center   3/2/2023  8:20 AM Janna Bermudez MD [facility] Eliseo Romo     Notify our clinic if he has not heard back from psychiatry     Labs from ER reviewed, patient states he has blood in his stool everyday, no signs on anemia on labs. F/u with Gastroenterologist

## 2022-09-09 ENCOUNTER — PATIENT MESSAGE (OUTPATIENT)
Dept: PRIMARY CARE CLINIC | Facility: CLINIC | Age: 47
End: 2022-09-09
Payer: MEDICAID

## 2022-09-09 ENCOUNTER — PATIENT MESSAGE (OUTPATIENT)
Dept: ADMINISTRATIVE | Facility: HOSPITAL | Age: 47
End: 2022-09-09
Payer: MEDICAID

## 2022-09-09 ENCOUNTER — TELEPHONE (OUTPATIENT)
Dept: PRIMARY CARE CLINIC | Facility: CLINIC | Age: 47
End: 2022-09-09
Payer: MEDICAID

## 2022-09-09 DIAGNOSIS — Z12.11 SCREENING FOR COLON CANCER: ICD-10-CM

## 2022-09-09 NOTE — TELEPHONE ENCOUNTER
The patient was advised to wait until they call him to schedule. Pt verbalized understanding.     ----- Message from Alam Langley sent at 9/9/2022 12:38 PM CDT -----  Contact: pt  Pt calling wanting to know what the process for pt to get a biopsy and he can be reached at [phone].  Pt wants to get the process started.      Thanks,           None

## 2022-09-12 ENCOUNTER — TELEPHONE (OUTPATIENT)
Dept: PRIMARY CARE CLINIC | Facility: CLINIC | Age: 47
End: 2022-09-12
Payer: MEDICAID

## 2022-09-12 ENCOUNTER — PATIENT MESSAGE (OUTPATIENT)
Dept: PRIMARY CARE CLINIC | Facility: CLINIC | Age: 47
End: 2022-09-12
Payer: MEDICAID

## 2022-09-12 NOTE — TELEPHONE ENCOUNTER
----- Message from Dilan Montano sent at 9/12/2022  1:43 PM CDT -----  Contact: [facility]  .Type:  Patient Requesting Referral    Who Called:jerome  Does the patient already have the specialty appointment scheduled?:no  If yes, what is the date of that appointment?:  Referral to What Specialty:gastro  Reason for Referral:  Does the patient want the referral with a specific physician?:kylie chen  [facility]  Patient Requesting a Response?:yes  Would the patient rather a call back or a response via [facility]?   Best Call Back Number:.[phone]    Additional Information:

## 2022-09-12 NOTE — TELEPHONE ENCOUNTER
Pt advised on Friday per scheduling/IR that he had to wait on a call to schedule this appt. He also spoke to me after her spoke directly to them and was advised the same. I CANNOT rush this office.     ----- Message from Dilan Montano sent at 9/12/2022  1:17 PM CDT -----  Contact: [facility]  .Type:  Patient Returning Call    Who Called:[facility]  Who Left Message for Patient:  Does the patient know what this is regarding?:pt needs referal sent for liver biopsy that is closer to [place]   Would the patient rather a call back or a response via [facility]?   Best Call Back Number:.[phone]    Additional Information:

## 2022-09-13 ENCOUNTER — TELEPHONE (OUTPATIENT)
Dept: PRIMARY CARE CLINIC | Facility: CLINIC | Age: 47
End: 2022-09-13
Payer: MEDICAID

## 2022-09-13 ENCOUNTER — PATIENT MESSAGE (OUTPATIENT)
Dept: PRIMARY CARE CLINIC | Facility: CLINIC | Age: 47
End: 2022-09-13
Payer: MEDICAID

## 2022-09-13 NOTE — TELEPHONE ENCOUNTER
I called scheduling to verify the order for the liver biopsy was received. I was advised there was a note from radiology. The radiology states they needed H&P and imaging results. I am faxing now to the radiology department.

## 2022-09-15 ENCOUNTER — PATIENT MESSAGE (OUTPATIENT)
Dept: PRIMARY CARE CLINIC | Facility: CLINIC | Age: 47
End: 2022-09-15
Payer: MEDICAID

## 2022-09-19 LAB
ABS NRBC COUNT: 0 THOU/UL (ref 0–0.01)
APTT PPP: 38.7 SEC (ref 25.7–36.7)
ERYTHROCYTE [DISTWIDTH] IN BLOOD BY AUTOMATED COUNT: 13.2 % (ref 0–15.5)
HCT VFR BLD AUTO: 47.6 % (ref 42–52)
HGB BLD-MCNC: 15.6 G/DL (ref 14–18)
INR PPP: 1.3 INR (ref 0.9–1.1)
MCH RBC QN AUTO: 27.9 PG (ref 27–32)
MCHC RBC AUTO-ENTMCNC: 32.8 % (ref 32–36)
MCV RBC AUTO: 85 FL (ref 80–97)
NUCLEATED RED BLOOD CELLS: 0 % (ref 0–0.2)
PLATELETS: 190 10*3/UL (ref 130–400)
PMV BLD AUTO: 9.7 FL (ref 9.2–12.2)
PROTHROMBIN TIME: 15.1 SEC (ref 10.2–12.9)
RBC # BLD AUTO: 5.6 10*6/UL (ref 4.7–6.1)
WBC # BLD: 6.7 10*3/UL (ref 4.5–10)

## 2022-09-22 ENCOUNTER — PATIENT MESSAGE (OUTPATIENT)
Dept: PRIMARY CARE CLINIC | Facility: CLINIC | Age: 47
End: 2022-09-22
Payer: MEDICAID

## 2022-09-22 ENCOUNTER — TELEPHONE (OUTPATIENT)
Dept: PRIMARY CARE CLINIC | Facility: CLINIC | Age: 47
End: 2022-09-22
Payer: MEDICAID

## 2022-09-27 ENCOUNTER — PATIENT MESSAGE (OUTPATIENT)
Dept: PRIMARY CARE CLINIC | Facility: CLINIC | Age: 47
End: 2022-09-27
Payer: MEDICAID

## 2022-09-29 DIAGNOSIS — I10 ESSENTIAL HYPERTENSION: Primary | ICD-10-CM

## 2022-09-30 LAB — SPECIMEN TO PATHOLOGY: NORMAL

## 2022-10-03 DIAGNOSIS — R16.0 LIVER MASS: Primary | ICD-10-CM

## 2022-10-05 ENCOUNTER — PATIENT MESSAGE (OUTPATIENT)
Dept: PRIMARY CARE CLINIC | Facility: CLINIC | Age: 47
End: 2022-10-05
Payer: MEDICAID

## 2022-10-05 ENCOUNTER — TELEPHONE (OUTPATIENT)
Dept: PRIMARY CARE CLINIC | Facility: CLINIC | Age: 47
End: 2022-10-05
Payer: MEDICAID

## 2022-10-05 NOTE — TELEPHONE ENCOUNTER
----- Message from Sue Lazo sent at 10/5/2022  9:17 AM CDT -----  Contact: [facility]  Toshia from [facility] called regarding pt. Toshia stated pt need a referral sent over to an Oncologist. MANNY Pope stated pt need a referral sent to Dr Wagner. Please call pt when referral is sent out at [phone]

## 2022-10-05 NOTE — TELEPHONE ENCOUNTER
"The patient called his insurance with a grievance report. The nurse Judie, was asking what the biopsy(Successful ultrasound-guided core biopsy of left hepatic lobe mass) result stated. Christina was also advised the patient was told the results were "benign."   The patient was still concerned, so Dr Bermudez made a referral to Dr Farnaz Royal MD with [facility] on yesterday. Judie was given the info to his office so she could follow up on it.       ----- Message from Charlotte Bajwa sent at 10/5/2022 12:04 PM CDT -----  Regarding: [employer]   Contact: [phone]  Judie/Celsias is calling to speak to nurse regarding pt referral to hem oc. Please call back at [phone]//thank you acc    "

## 2022-10-07 ENCOUNTER — TELEPHONE (OUTPATIENT)
Dept: PRIMARY CARE CLINIC | Facility: CLINIC | Age: 47
End: 2022-10-07
Payer: MEDICAID

## 2022-10-07 NOTE — TELEPHONE ENCOUNTER
Received a fax(10/05/22) from [facility]   [address] [place] [address]  APPT 10/27/22 @10 AM WITH DR VANESSA

## 2022-10-07 NOTE — TELEPHONE ENCOUNTER
TENZIN to advise Judie I was returning her call. She is advised the patient's referral was accepted at the [facility] due to receiving a note from the main campus from Dr Royal's office. She is advised, any other info that she needs to leave, needs to be in detail with the call center. She has been advised I am in clinic.       ----- Message from Savana Olivera LPN sent at 10/7/2022  8:55 AM CDT -----  Regarding: FW: [facility]  Contact: [phone]    ----- Message -----  From: Charlotte Bajwa  Sent: 10/5/2022  12:05 PM CDT  To: Aidan Saldivar Staff  Subject: [facility]                                Judie/[employer] [facility] is calling to speak to nurse regarding pt referral to hem oc. Please call back at [phone]//thank you acc

## 2022-10-27 ENCOUNTER — PATIENT MESSAGE (OUTPATIENT)
Dept: ADMINISTRATIVE | Facility: HOSPITAL | Age: 47
End: 2022-10-27
Payer: MEDICAID

## 2022-10-31 ENCOUNTER — PATIENT OUTREACH (OUTPATIENT)
Dept: ADMINISTRATIVE | Facility: HOSPITAL | Age: 47
End: 2022-10-31
Payer: MEDICAID

## 2022-10-31 ENCOUNTER — PATIENT MESSAGE (OUTPATIENT)
Dept: ADMINISTRATIVE | Facility: HOSPITAL | Age: 47
End: 2022-10-31
Payer: MEDICAID

## 2022-10-31 NOTE — Clinical Note
Patient is interested in Digital Medicine. Would you please write an order so the process can be started. This was performed in the past however pt began having technical issues with his phone. He has upgraded his phone since then and is now ready to proceed with Digital Medicine. Thank you.

## 2022-10-31 NOTE — PROGRESS NOTES
Conversing with pt today via portal message. Pt did call med and I was able to speak with him and explain why I was inquiring about if he was monitoring his BP at home. Pt did explain that in the past he was sent a link to sign up for digital medicine. He had an I-phone 6 at the time and starting having technical difficulties with the phone itself. He did upgrade his phone however once he clicked on the dig med link, it was no longer viable. I asked pt if he is still interested in getting on digital medicine and he said yes. I assured him that I will take care of making sure everything is taken care of for him. I will stay in contact with pt through the process to make sure it goes as smooth as possible. Pt very appreciative for the assistance.   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 Farnaz Royal. Seemingly Dr. Royal explained to pt that research was just recently performed on this type of cancer b/c it is so rare. Possibility that pt will need sx.   Explained to pt that if there was any further assistance that he needed, to please contact me. If I do not have the answer, I will find the answer for him or point him to the right . Pt expressed 100% understanding and thanked me for taking the time to listen to him.   PCP notified of pts interest in Digital Medicine and the need for an order to be written. Will f/u with the process to make sure pt is able to complete the questionnaire in time.

## 2022-11-11 DIAGNOSIS — C22.9 LIVER CANCER: Primary | ICD-10-CM

## 2022-11-14 ENCOUNTER — PATIENT OUTREACH (OUTPATIENT)
Dept: ADMINISTRATIVE | Facility: HOSPITAL | Age: 47
End: 2022-11-14
Payer: MEDICAID

## 2022-11-14 NOTE — LETTER
AUTHORIZATION FOR RELEASE OF   CONFIDENTIAL INFORMATION      We are seeing Tray Perez, date of birth 1975, in the clinic at [facility]. Janna Bremudez MD is the patient's PCP. Tray Perez has an outstanding lab/procedure at the time we reviewed his chart. In order to help keep his health information updated, he has authorized us to request the following medical record(s):        (  )  MAMMOGRAM                                      ( X )  COLONOSCOPY & PATHOLOGY       (  )  PAP SMEAR                                          (  )  OUTSIDE LAB RESULTS     (  )  DEXA SCAN                                          (  )  EYE EXAM            (  )  FOOT EXAM                                          (  )  ENTIRE RECORD     (  )  OUTSIDE IMMUNIZATIONS                 (  )  _______________         Please fax records to [facility] MD, [phone]     If you have any questions, please contact Marcie Mckeon           Patient Name: Tray Perez  : 1975  Patient Phone #: [phone]

## 2022-11-16 ENCOUNTER — TELEPHONE (OUTPATIENT)
Dept: PRIMARY CARE CLINIC | Facility: CLINIC | Age: 47
End: 2022-11-16
Payer: MEDICAID

## 2022-11-16 ENCOUNTER — PATIENT MESSAGE (OUTPATIENT)
Dept: ADMINISTRATIVE | Facility: HOSPITAL | Age: 47
End: 2022-11-16
Payer: MEDICAID

## 2022-12-02 NOTE — PROGRESS NOTES
History & Physical    CHIEF COMPLAINT:  LIVER MASS     History of Present Illness:  47 year-old-male referred by Dr. Royal.  Patient has had complaints of passing blood in stools and abdominal cramping x several years.  MRI of the abdomen was done in August 2020, I personally reviewed and interpreted the images, which showed a 4.7 cm mass in the left lobe of the liver.  He did not pursue follow up of the mass due to family matters and COVID.  He underwent liver biopsy in Sept 2022.  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  Follow up MRI abdomen done in Nov showed  a slightly smaller left lobe liver lesion.  CT chest negative.    Greater than 45 minutes was required for complete chart review, imaging review including interpretation of imaging, coordination with referring/other physicians, patient counseling regarding diagnosis/treatment plan, answering questions, medical decision making, and documentation.        Review of patient's allergies indicates:  No Known Allergies    Current Outpatient Medications   Medication Sig Dispense Refill    citalopram (CELEXA) 10 MG tablet Take 1 tablet (10 mg total) by mouth once daily. 30 tablet 11    lisinopriL 10 MG tablet Take 1 tablet (10 mg total) by mouth once daily. 90 tablet 3     No current facility-administered medications for this visit.       Past Medical History:   Diagnosis Date    Anxiety     Ndiaye's esophagus     Colitis     Depression     GERD (gastroesophageal reflux disease)     Hypertension     IBS (irritable bowel syndrome)     Urinary tract infection      Past Surgical History:   Procedure Laterality Date    CARPAL TUNNEL RELEASE      FRACTURE SURGERY      ULNAR TUNNEL RELEASE       Family History   Problem Relation Age of Onset    Heart disease Father     Hypertension Father     No Known Problems Mother      Social History     Tobacco Use    Smoking status: Every Day     Packs/day: 0.50     Years: 2.00     Pack years: 1.00     Types:  Cigarettes     Start date: 8/27/2020    Smokeless tobacco: Never   Substance Use Topics    Alcohol use: Not Currently    Drug use: Yes     Types: Marijuana        Review of Systems:  Review of Systems   Constitutional:  Negative for activity change, appetite change, chills, diaphoresis, fatigue and fever.   HENT:  Negative for congestion, ear pain, tinnitus and trouble swallowing.    Eyes:  Negative for photophobia and pain.   Respiratory:  Negative for apnea, cough, choking, chest tightness, shortness of breath and stridor.    Cardiovascular:  Negative for chest pain, palpitations and leg swelling.   Endocrine: Negative for cold intolerance and heat intolerance.   Genitourinary:  Negative for difficulty urinating, dysuria, enuresis, flank pain, frequency and hematuria.   Musculoskeletal:  Negative for arthralgias, back pain and gait problem.   Neurological:  Negative for dizziness, seizures, syncope, facial asymmetry, speech difficulty, weakness, light-headedness, numbness and headaches.   Psychiatric/Behavioral:  Negative for agitation, behavioral problems, confusion and decreased concentration.    All other systems reviewed and are negative.    OBJECTIVE:     Vital Signs (Most Recent)              Physical Exam:  Physical Exam  Constitutional:       General: He is not in acute distress.     Appearance: Normal appearance. He is well-developed and normal weight. He is not ill-appearing, toxic-appearing or diaphoretic.   HENT:      Head: Normocephalic and atraumatic.      Right Ear: Hearing and external ear normal.      Left Ear: Hearing and external ear normal.      Nose: No congestion or rhinorrhea.      Mouth/Throat:      Mouth: Mucous membranes are moist.      Pharynx: Oropharynx is clear. No oropharyngeal exudate.   Eyes:      General: Lids are normal. Gaze aligned appropriately. No scleral icterus.     Extraocular Movements: Extraocular movements intact.      Right eye: Normal extraocular motion and no  nystagmus.      Left eye: Normal extraocular motion and no nystagmus.      Conjunctiva/sclera: Conjunctivae normal.      Right eye: Right conjunctiva is not injected.      Left eye: Left conjunctiva is not injected.      Pupils: Pupils are equal, round, and reactive to light.   Neck:      Vascular: No carotid bruit or JVD.      Trachea: Trachea and phonation normal.   Cardiovascular:      Rate and Rhythm: Normal rate and regular rhythm.      Pulses: Normal pulses.      Heart sounds: Normal heart sounds. No murmur heard.    No friction rub. No gallop.   Pulmonary:      Effort: Pulmonary effort is normal. No tachypnea, accessory muscle usage, respiratory distress or retractions.      Breath sounds: Normal breath sounds and air entry. No stridor or decreased air movement. No wheezing or rhonchi.   Chest:      Chest wall: No mass, deformity, swelling, tenderness or crepitus.   Abdominal:      General: Abdomen is flat. Bowel sounds are normal. There is no distension. There are no signs of injury.      Palpations: Abdomen is soft. There is no shifting dullness, fluid wave, hepatomegaly, splenomegaly or mass.      Tenderness: There is no abdominal tenderness. There is no guarding or rebound.      Hernia: No hernia is present.   Musculoskeletal:         General: No swelling or tenderness.      Cervical back: Normal range of motion and neck supple. No rigidity, tenderness or crepitus.   Lymphadenopathy:      Head:      Right side of head: No submental, submandibular or occipital adenopathy.      Left side of head: No submental, submandibular or occipital adenopathy.      Cervical: No cervical adenopathy.      Right cervical: No superficial or posterior cervical adenopathy.     Left cervical: No superficial or posterior cervical adenopathy.      Upper Body:      Right upper body: No supraclavicular or axillary adenopathy.      Left upper body: No supraclavicular or axillary adenopathy.      Lower Body: No right inguinal  adenopathy. No left inguinal adenopathy.   Skin:     General: Skin is warm.      Capillary Refill: Capillary refill takes less than 2 seconds.      Coloration: Skin is not cyanotic, jaundiced, mottled or pale.      Findings: No bruising, ecchymosis, erythema, lesion, petechiae or rash.      Nails: There is no clubbing.   Neurological:      General: No focal deficit present.      Mental Status: He is alert and oriented to person, place, and time.      Cranial Nerves: No cranial nerve deficit or facial asymmetry.      Sensory: No sensory deficit.      Motor: No weakness, tremor, atrophy or abnormal muscle tone.      Coordination: Coordination normal.      Gait: Gait normal.      Deep Tendon Reflexes: Reflexes normal.   Psychiatric:         Attention and Perception: Attention and perception normal.         Mood and Affect: Mood normal. Mood is not anxious or depressed. Affect is not blunt or flat.         Speech: Speech normal. Speech is not slurred.         Behavior: Behavior normal. Behavior is cooperative.         ASSESSMENT/PLAN:     Hepatic small-vessel neoplasm    Diagnosis and treatment guidelines were discussed with the patient in detail.  Recommend excision versus ablation of the lesion due to diagnostic uncertainty as well as ambiguity regarding the biology and behavior of these lesions.    Scheduled for laparoscopic/robotic resection versus microwave ablation of left lobe liver lesion, intraoperative ultrasound    The risks and benefits of the procedure were explained in detail, questions were addressed, the patient gives consent to proceed      Shelton Hector MD  Surgical Oncology  Complex General, Gastrointestinal and Hepatobiliary Surgery

## 2022-12-06 ENCOUNTER — OFFICE VISIT (OUTPATIENT)
Dept: SURGICAL ONCOLOGY | Facility: CLINIC | Age: 47
End: 2022-12-06
Payer: MEDICAID

## 2022-12-06 VITALS
HEIGHT: 74 IN | DIASTOLIC BLOOD PRESSURE: 88 MMHG | SYSTOLIC BLOOD PRESSURE: 143 MMHG | WEIGHT: 302.19 LBS | HEART RATE: 80 BPM | BODY MASS INDEX: 38.78 KG/M2

## 2022-12-06 DIAGNOSIS — C22.9 LIVER CANCER: ICD-10-CM

## 2022-12-06 PROCEDURE — 99204 PR OFFICE/OUTPT VISIT, NEW, LEVL IV, 45-59 MIN: ICD-10-PCS | Mod: S$PBB,,, | Performed by: SURGERY

## 2022-12-06 PROCEDURE — 3079F PR MOST RECENT DIASTOLIC BLOOD PRESSURE 80-89 MM HG: ICD-10-PCS | Mod: CPTII,,, | Performed by: SURGERY

## 2022-12-06 PROCEDURE — 1159F PR MEDICATION LIST DOCUMENTED IN MEDICAL RECORD: ICD-10-PCS | Mod: CPTII,,, | Performed by: SURGERY

## 2022-12-06 PROCEDURE — 3077F PR MOST RECENT SYSTOLIC BLOOD PRESSURE >= 140 MM HG: ICD-10-PCS | Mod: CPTII,,, | Performed by: SURGERY

## 2022-12-06 PROCEDURE — 3008F BODY MASS INDEX DOCD: CPT | Mod: CPTII,,, | Performed by: SURGERY

## 2022-12-06 PROCEDURE — 99204 OFFICE O/P NEW MOD 45 MIN: CPT | Mod: S$PBB,,, | Performed by: SURGERY

## 2022-12-06 PROCEDURE — 3077F SYST BP >= 140 MM HG: CPT | Mod: CPTII,,, | Performed by: SURGERY

## 2022-12-06 PROCEDURE — 99213 OFFICE O/P EST LOW 20 MIN: CPT | Mod: PBBFAC | Performed by: SURGERY

## 2022-12-06 PROCEDURE — 99999 PR PBB SHADOW E&M-EST. PATIENT-LVL III: CPT | Mod: PBBFAC,,, | Performed by: SURGERY

## 2022-12-06 PROCEDURE — 99999 PR PBB SHADOW E&M-EST. PATIENT-LVL III: ICD-10-PCS | Mod: PBBFAC,,, | Performed by: SURGERY

## 2022-12-06 PROCEDURE — 3008F PR BODY MASS INDEX (BMI) DOCUMENTED: ICD-10-PCS | Mod: CPTII,,, | Performed by: SURGERY

## 2022-12-06 PROCEDURE — 4010F ACE/ARB THERAPY RXD/TAKEN: CPT | Mod: CPTII,,, | Performed by: SURGERY

## 2022-12-06 PROCEDURE — 3079F DIAST BP 80-89 MM HG: CPT | Mod: CPTII,,, | Performed by: SURGERY

## 2022-12-06 PROCEDURE — 1159F MED LIST DOCD IN RCRD: CPT | Mod: CPTII,,, | Performed by: SURGERY

## 2022-12-06 PROCEDURE — 4010F PR ACE/ARB THEARPY RXD/TAKEN: ICD-10-PCS | Mod: CPTII,,, | Performed by: SURGERY

## 2022-12-06 RX ORDER — HYDROCODONE BITARTRATE AND ACETAMINOPHEN 5; 325 MG/1; MG/1
1 TABLET ORAL EVERY 4 HOURS PRN
COMMUNITY
Start: 2022-11-03

## 2023-01-10 DIAGNOSIS — R16.0 LIVER MASS: ICD-10-CM

## 2023-01-10 DIAGNOSIS — C22.9 MALIGNANT NEOPLASM OF LIVER, UNSPECIFIED LIVER MALIGNANCY TYPE: Primary | ICD-10-CM

## 2023-01-10 RX ORDER — ENOXAPARIN SODIUM 100 MG/ML
30 INJECTION SUBCUTANEOUS EVERY 24 HOURS
Status: CANCELLED | OUTPATIENT
Start: 2023-01-10

## 2023-01-17 ENCOUNTER — OFFICE VISIT (OUTPATIENT)
Dept: SURGICAL ONCOLOGY | Facility: CLINIC | Age: 48
End: 2023-01-17
Payer: MEDICAID

## 2023-01-17 DIAGNOSIS — C22.9 MALIGNANT NEOPLASM OF LIVER, UNSPECIFIED LIVER MALIGNANCY TYPE: Primary | ICD-10-CM

## 2023-01-17 PROCEDURE — 99213 PR OFFICE/OUTPT VISIT, EST, LEVL III, 20-29 MIN: ICD-10-PCS | Mod: S$PBB,,, | Performed by: NURSE PRACTITIONER

## 2023-01-17 PROCEDURE — 99213 OFFICE O/P EST LOW 20 MIN: CPT | Mod: S$PBB,,, | Performed by: NURSE PRACTITIONER

## 2023-01-17 NOTE — PROGRESS NOTES
PRE OPERATIVE EVALUATION  DX HEPATIC SMALL VESSEL NEOPLASM  SURGERY PLANNED LAP/LUIS RESECTION VERSUS ABLATION OF LEFT LOBE LIVER LESION, INTRAOP ULS    /92, PULSE 78, HEIGHT 6.2, WEIGHT 302LBS    PT REPORTS HAS BEEN DOING WELL  NO CHANGES TO HEALTH  NO NEW MEDICAL PROBLEMS  NO NEW MEDICATIONS  NO NEW SURGICAL PROCEDURES  NO RECENT ILLNESSES     FAMILY PRESENT    ROS NEGATIVE    PE   AWAKE ALERT NO DISTRESS  SKIN WARM DRY  LUNGS CLEAR BILATERALLY  HEART RRR  ABD BENIGN  MUSC LI NO DEFICITS  NEURO INTACT    SURGERY AGAIN DISCUSSED AT LENGTH AND QUESTIONS ANSWERED  HE IS AWARE AND WISHES TO PROCEED    SX DATE 1/23/2023

## 2023-01-17 NOTE — H&P (VIEW-ONLY)
History & Physical    CHIEF COMPLAINT:  LIVER MASS     History of Present Illness:  47 year-old-male referred by Dr. Royal.  Patient has had complaints of passing blood in stools and abdominal cramping x several years.  MRI of the abdomen was done in August 2020, I personally reviewed and interpreted the images, which showed a 4.7 cm mass in the left lobe of the liver.  He did not pursue follow up of the mass due to family matters and COVID.  He underwent liver biopsy in Sept 2022.  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  Follow up MRI abdomen done in Nov showed  a slightly smaller left lobe liver lesion.  CT chest negative.    Greater than 45 minutes was required for complete chart review, imaging review including interpretation of imaging, coordination with referring/other physicians, patient counseling regarding diagnosis/treatment plan, answering questions, medical decision making, and documentation.        Review of patient's allergies indicates:  No Known Allergies    Current Outpatient Medications   Medication Sig Dispense Refill    citalopram (CELEXA) 10 MG tablet Take 1 tablet (10 mg total) by mouth once daily. 30 tablet 11    HYDROcodone-acetaminophen (NORCO) 5-325 mg per tablet Take 1 tablet by mouth.      lisinopriL 10 MG tablet Take 1 tablet (10 mg total) by mouth once daily. 90 tablet 3     No current facility-administered medications for this visit.       Past Medical History:   Diagnosis Date    Anxiety     Ndiaye's esophagus     Colitis     Depression     GERD (gastroesophageal reflux disease)     Hypertension     IBS (irritable bowel syndrome)     Urinary tract infection      Past Surgical History:   Procedure Laterality Date    CARPAL TUNNEL RELEASE      FRACTURE SURGERY      Seabceous Cyst Removed from Forehead      ULNAR TUNNEL RELEASE       Family History   Problem Relation Age of Onset    Heart disease Father     Hypertension Father     No Known Problems  Mother      Social History     Tobacco Use    Smoking status: Every Day     Packs/day: 0.50     Years: 2.00     Pack years: 1.00     Types: Cigarettes     Start date: 8/27/2020    Smokeless tobacco: Never   Substance Use Topics    Alcohol use: Not Currently    Drug use: Yes     Types: Marijuana        Review of Systems:  Review of Systems   Constitutional:  Negative for activity change, appetite change, chills, diaphoresis, fatigue and fever.   HENT:  Negative for congestion, ear pain, tinnitus and trouble swallowing.    Eyes:  Negative for photophobia and pain.   Respiratory:  Negative for apnea, cough, choking, chest tightness, shortness of breath and stridor.    Cardiovascular:  Negative for chest pain, palpitations and leg swelling.   Endocrine: Negative for cold intolerance and heat intolerance.   Genitourinary:  Negative for difficulty urinating, dysuria, enuresis, flank pain, frequency and hematuria.   Musculoskeletal:  Negative for arthralgias, back pain and gait problem.   Neurological:  Negative for dizziness, seizures, syncope, facial asymmetry, speech difficulty, weakness, light-headedness, numbness and headaches.   Psychiatric/Behavioral:  Negative for agitation, behavioral problems, confusion and decreased concentration.    All other systems reviewed and are negative.    OBJECTIVE:     Vital Signs (Most Recent)              Physical Exam:  Physical Exam  Constitutional:       General: He is not in acute distress.     Appearance: Normal appearance. He is well-developed and normal weight. He is not ill-appearing, toxic-appearing or diaphoretic.   HENT:      Head: Normocephalic and atraumatic.      Right Ear: Hearing and external ear normal.      Left Ear: Hearing and external ear normal.      Nose: No congestion or rhinorrhea.      Mouth/Throat:      Mouth: Mucous membranes are moist.      Pharynx: Oropharynx is clear. No oropharyngeal exudate.   Eyes:      General: Lids are normal. Gaze aligned  appropriately. No scleral icterus.     Extraocular Movements: Extraocular movements intact.      Right eye: Normal extraocular motion and no nystagmus.      Left eye: Normal extraocular motion and no nystagmus.      Conjunctiva/sclera: Conjunctivae normal.      Right eye: Right conjunctiva is not injected.      Left eye: Left conjunctiva is not injected.      Pupils: Pupils are equal, round, and reactive to light.   Neck:      Vascular: No carotid bruit or JVD.      Trachea: Trachea and phonation normal.   Cardiovascular:      Rate and Rhythm: Normal rate and regular rhythm.      Pulses: Normal pulses.      Heart sounds: Normal heart sounds. No murmur heard.    No friction rub. No gallop.   Pulmonary:      Effort: Pulmonary effort is normal. No tachypnea, accessory muscle usage, respiratory distress or retractions.      Breath sounds: Normal breath sounds and air entry. No stridor or decreased air movement. No wheezing or rhonchi.   Chest:      Chest wall: No mass, deformity, swelling, tenderness or crepitus.   Abdominal:      General: Abdomen is flat. Bowel sounds are normal. There is no distension. There are no signs of injury.      Palpations: Abdomen is soft. There is no shifting dullness, fluid wave, hepatomegaly, splenomegaly or mass.      Tenderness: There is no abdominal tenderness. There is no guarding or rebound.      Hernia: No hernia is present.   Musculoskeletal:         General: No swelling or tenderness.      Cervical back: Normal range of motion and neck supple. No rigidity, tenderness or crepitus.   Lymphadenopathy:      Head:      Right side of head: No submental, submandibular or occipital adenopathy.      Left side of head: No submental, submandibular or occipital adenopathy.      Cervical: No cervical adenopathy.      Right cervical: No superficial or posterior cervical adenopathy.     Left cervical: No superficial or posterior cervical adenopathy.      Upper Body:      Right upper body: No  supraclavicular or axillary adenopathy.      Left upper body: No supraclavicular or axillary adenopathy.      Lower Body: No right inguinal adenopathy. No left inguinal adenopathy.   Skin:     General: Skin is warm.      Capillary Refill: Capillary refill takes less than 2 seconds.      Coloration: Skin is not cyanotic, jaundiced, mottled or pale.      Findings: No bruising, ecchymosis, erythema, lesion, petechiae or rash.      Nails: There is no clubbing.   Neurological:      General: No focal deficit present.      Mental Status: He is alert and oriented to person, place, and time.      Cranial Nerves: No cranial nerve deficit or facial asymmetry.      Sensory: No sensory deficit.      Motor: No weakness, tremor, atrophy or abnormal muscle tone.      Coordination: Coordination normal.      Gait: Gait normal.      Deep Tendon Reflexes: Reflexes normal.   Psychiatric:         Attention and Perception: Attention and perception normal.         Mood and Affect: Mood normal. Mood is not anxious or depressed. Affect is not blunt or flat.         Speech: Speech normal. Speech is not slurred.         Behavior: Behavior normal. Behavior is cooperative.         ASSESSMENT/PLAN:     Hepatic small-vessel neoplasm    Diagnosis and treatment guidelines were discussed with the patient in detail.  Recommend excision versus ablation of the lesion due to diagnostic uncertainty as well as ambiguity regarding the biology and behavior of these lesions.    Scheduled for laparoscopic/robotic resection versus microwave ablation of left lobe liver lesion, intraoperative ultrasound    The risks and benefits of the procedure were explained in detail, questions were addressed, the patient gives consent to proceed      Shelton Hector MD  Surgical Oncology  Complex General, Gastrointestinal and Hepatobiliary Surgery

## 2023-01-18 RX ORDER — CETIRIZINE HYDROCHLORIDE 10 MG/1
10 TABLET ORAL DAILY PRN
COMMUNITY

## 2023-01-18 RX ORDER — ACETAMINOPHEN 500 MG
1000 TABLET ORAL EVERY 6 HOURS PRN
COMMUNITY

## 2023-01-18 RX ORDER — ONDANSETRON HYDROCHLORIDE 8 MG/1
8 TABLET, FILM COATED ORAL EVERY 8 HOURS PRN
COMMUNITY
Start: 2023-01-12

## 2023-01-19 ENCOUNTER — ANESTHESIA EVENT (OUTPATIENT)
Dept: SURGERY | Facility: HOSPITAL | Age: 48
End: 2023-01-19
Payer: MEDICAID

## 2023-01-20 ENCOUNTER — PATIENT MESSAGE (OUTPATIENT)
Dept: ADMINISTRATIVE | Facility: OTHER | Age: 48
End: 2023-01-20
Payer: MEDICAID

## 2023-01-23 ENCOUNTER — HOSPITAL ENCOUNTER (OUTPATIENT)
Facility: HOSPITAL | Age: 48
Discharge: HOME | End: 2023-01-23
Attending: SURGERY | Admitting: SURGERY
Payer: MEDICAID

## 2023-01-23 ENCOUNTER — ANESTHESIA (OUTPATIENT)
Dept: SURGERY | Facility: HOSPITAL | Age: 48
End: 2023-01-23
Payer: MEDICAID

## 2023-01-23 VITALS
OXYGEN SATURATION: 95 % | HEIGHT: 74 IN | BODY MASS INDEX: 39.14 KG/M2 | DIASTOLIC BLOOD PRESSURE: 92 MMHG | SYSTOLIC BLOOD PRESSURE: 162 MMHG | RESPIRATION RATE: 18 BRPM | HEART RATE: 73 BPM | WEIGHT: 305 LBS | TEMPERATURE: 99 F

## 2023-01-23 DIAGNOSIS — R16.0 LIVER MASS: ICD-10-CM

## 2023-01-23 DIAGNOSIS — C22.9 MALIGNANT NEOPLASM OF LIVER, UNSPECIFIED LIVER MALIGNANCY TYPE: ICD-10-CM

## 2023-01-23 LAB
ABORH RETYPE: NORMAL
ABORH RETYPE: NORMAL
GROUP & RH: NORMAL
INDIRECT COOMBS GEL: NORMAL

## 2023-01-23 PROCEDURE — 76940 PR  US GUIDE, TISSUE ABLATION: ICD-10-PCS | Mod: 26,,, | Performed by: SURGERY

## 2023-01-23 PROCEDURE — 37000009 HC ANESTHESIA EA ADD 15 MINS: Performed by: SURGERY

## 2023-01-23 PROCEDURE — 25000003 PHARM REV CODE 250: Performed by: STUDENT IN AN ORGANIZED HEALTH CARE EDUCATION/TRAINING PROGRAM

## 2023-01-23 PROCEDURE — 63600175 PHARM REV CODE 636 W HCPCS: Performed by: SURGERY

## 2023-01-23 PROCEDURE — 27201423 OPTIME MED/SURG SUP & DEVICES STERILE SUPPLY: Performed by: SURGERY

## 2023-01-23 PROCEDURE — 47379 PR LAPARSCOPIC, NEEDLE LIVER BIOPSY: ICD-10-PCS | Mod: 59,,, | Performed by: SURGERY

## 2023-01-23 PROCEDURE — 71000016 HC POSTOP RECOV ADDL HR: Performed by: SURGERY

## 2023-01-23 PROCEDURE — 63600175 PHARM REV CODE 636 W HCPCS

## 2023-01-23 PROCEDURE — 63600175 PHARM REV CODE 636 W HCPCS: Performed by: ANESTHESIOLOGY

## 2023-01-23 PROCEDURE — 47370 PR LAP,ABLAT 1+ LIVER TUMOR(S),RADIOFREQ: ICD-10-PCS | Mod: ,,, | Performed by: SURGERY

## 2023-01-23 PROCEDURE — 37000008 HC ANESTHESIA 1ST 15 MINUTES: Performed by: SURGERY

## 2023-01-23 PROCEDURE — 86900 BLOOD TYPING SEROLOGIC ABO: CPT | Performed by: ANESTHESIOLOGY

## 2023-01-23 PROCEDURE — 36415 COLL VENOUS BLD VENIPUNCTURE: CPT | Performed by: SURGERY

## 2023-01-23 PROCEDURE — 63600175 PHARM REV CODE 636 W HCPCS: Performed by: STUDENT IN AN ORGANIZED HEALTH CARE EDUCATION/TRAINING PROGRAM

## 2023-01-23 PROCEDURE — 36415 COLL VENOUS BLD VENIPUNCTURE: CPT | Performed by: ANESTHESIOLOGY

## 2023-01-23 PROCEDURE — 25000003 PHARM REV CODE 250: Performed by: SURGERY

## 2023-01-23 PROCEDURE — 00790 ANES IPER UPR ABD NOS: CPT | Performed by: SURGERY

## 2023-01-23 PROCEDURE — A4216 STERILE WATER/SALINE, 10 ML: HCPCS | Performed by: SURGERY

## 2023-01-23 PROCEDURE — 47370 LAPARO ABLATE LIVER TUMOR RF: CPT | Mod: ,,, | Performed by: SURGERY

## 2023-01-23 PROCEDURE — 36620 INSERTION CATHETER ARTERY: CPT

## 2023-01-23 PROCEDURE — 47379 UNLISTED LAPS PX LIVER: CPT | Mod: 59,,, | Performed by: SURGERY

## 2023-01-23 PROCEDURE — 71000015 HC POSTOP RECOV 1ST HR: Performed by: SURGERY

## 2023-01-23 PROCEDURE — 71000033 HC RECOVERY, INTIAL HOUR: Performed by: SURGERY

## 2023-01-23 PROCEDURE — 36000710: Performed by: SURGERY

## 2023-01-23 PROCEDURE — 25000003 PHARM REV CODE 250: Performed by: ANESTHESIOLOGY

## 2023-01-23 PROCEDURE — 36000711: Performed by: SURGERY

## 2023-01-23 PROCEDURE — 71000039 HC RECOVERY, EACH ADD'L HOUR: Performed by: SURGERY

## 2023-01-23 PROCEDURE — 76940 US GUIDE TISSUE ABLATION: CPT | Mod: 26,,, | Performed by: SURGERY

## 2023-01-23 RX ORDER — CEFAZOLIN SODIUM 2 G/50ML
2 SOLUTION INTRAVENOUS
Status: COMPLETED | OUTPATIENT
Start: 2023-01-23 | End: 2023-01-23

## 2023-01-23 RX ORDER — DEXAMETHASONE SODIUM PHOSPHATE 4 MG/ML
INJECTION, SOLUTION INTRA-ARTICULAR; INTRALESIONAL; INTRAMUSCULAR; INTRAVENOUS; SOFT TISSUE
Status: DISCONTINUED | OUTPATIENT
Start: 2023-01-23 | End: 2023-01-23

## 2023-01-23 RX ORDER — FENTANYL CITRATE 50 UG/ML
INJECTION, SOLUTION INTRAMUSCULAR; INTRAVENOUS
Status: DISCONTINUED | OUTPATIENT
Start: 2023-01-23 | End: 2023-01-23

## 2023-01-23 RX ORDER — HYDROMORPHONE HYDROCHLORIDE 2 MG/ML
0.2 INJECTION, SOLUTION INTRAMUSCULAR; INTRAVENOUS; SUBCUTANEOUS EVERY 5 MIN PRN
Status: COMPLETED | OUTPATIENT
Start: 2023-01-23 | End: 2023-01-23

## 2023-01-23 RX ORDER — ONDANSETRON 2 MG/ML
4 INJECTION INTRAMUSCULAR; INTRAVENOUS ONCE
Status: COMPLETED | OUTPATIENT
Start: 2023-01-23 | End: 2023-01-23

## 2023-01-23 RX ORDER — ROCURONIUM BROMIDE 10 MG/ML
INJECTION, SOLUTION INTRAVENOUS
Status: DISCONTINUED | OUTPATIENT
Start: 2023-01-23 | End: 2023-01-23

## 2023-01-23 RX ORDER — GLYCOPYRROLATE 0.2 MG/ML
INJECTION INTRAMUSCULAR; INTRAVENOUS
Status: DISCONTINUED | OUTPATIENT
Start: 2023-01-23 | End: 2023-01-23

## 2023-01-23 RX ORDER — HYDROCODONE BITARTRATE AND ACETAMINOPHEN 5; 325 MG/1; MG/1
1 TABLET ORAL
Status: DISCONTINUED | OUTPATIENT
Start: 2023-01-23 | End: 2023-01-23 | Stop reason: HOSPADM

## 2023-01-23 RX ORDER — HYDROMORPHONE HYDROCHLORIDE 2 MG/ML
INJECTION, SOLUTION INTRAMUSCULAR; INTRAVENOUS; SUBCUTANEOUS
Status: DISCONTINUED | OUTPATIENT
Start: 2023-01-23 | End: 2023-01-23

## 2023-01-23 RX ORDER — LIDOCAINE HYDROCHLORIDE 20 MG/ML
INJECTION, SOLUTION EPIDURAL; INFILTRATION; INTRACAUDAL; PERINEURAL
Status: DISCONTINUED | OUTPATIENT
Start: 2023-01-23 | End: 2023-01-23

## 2023-01-23 RX ORDER — ONDANSETRON HYDROCHLORIDE 2 MG/ML
INJECTION, SOLUTION INTRAMUSCULAR; INTRAVENOUS
Status: DISCONTINUED | OUTPATIENT
Start: 2023-01-23 | End: 2023-01-23

## 2023-01-23 RX ORDER — PROPOFOL 10 MG/ML
VIAL (ML) INTRAVENOUS
Status: DISCONTINUED | OUTPATIENT
Start: 2023-01-23 | End: 2023-01-23

## 2023-01-23 RX ORDER — SODIUM CHLORIDE 9 MG/ML
INJECTION, SOLUTION INTRAVENOUS CONTINUOUS
Status: DISCONTINUED | OUTPATIENT
Start: 2023-01-23 | End: 2023-01-23 | Stop reason: HOSPADM

## 2023-01-23 RX ORDER — ENOXAPARIN SODIUM 100 MG/ML
30 INJECTION SUBCUTANEOUS EVERY 24 HOURS
Status: DISCONTINUED | OUTPATIENT
Start: 2023-01-23 | End: 2023-01-23 | Stop reason: HOSPADM

## 2023-01-23 RX ORDER — LABETALOL HYDROCHLORIDE 5 MG/ML
10 INJECTION, SOLUTION INTRAVENOUS ONCE
Status: COMPLETED | OUTPATIENT
Start: 2023-01-23 | End: 2023-01-23

## 2023-01-23 RX ORDER — METOCLOPRAMIDE HYDROCHLORIDE 5 MG/ML
10 INJECTION INTRAMUSCULAR; INTRAVENOUS ONCE
Status: COMPLETED | OUTPATIENT
Start: 2023-01-23 | End: 2023-01-23

## 2023-01-23 RX ORDER — PROMETHAZINE HYDROCHLORIDE 25 MG/ML
12.5 INJECTION, SOLUTION INTRAMUSCULAR; INTRAVENOUS ONCE
Status: COMPLETED | OUTPATIENT
Start: 2023-01-23 | End: 2023-01-23

## 2023-01-23 RX ORDER — HYDROCODONE BITARTRATE AND ACETAMINOPHEN 7.5; 325 MG/1; MG/1
1 TABLET ORAL EVERY 6 HOURS PRN
Qty: 15 TABLET | Refills: 0 | Status: SHIPPED | OUTPATIENT
Start: 2023-01-23

## 2023-01-23 RX ORDER — ALBUMIN HUMAN 250 G/1000ML
SOLUTION INTRAVENOUS CONTINUOUS PRN
Status: DISCONTINUED | OUTPATIENT
Start: 2023-01-23 | End: 2023-01-23

## 2023-01-23 RX ORDER — DIPHENHYDRAMINE HYDROCHLORIDE 50 MG/ML
25 INJECTION INTRAMUSCULAR; INTRAVENOUS EVERY 6 HOURS PRN
Status: DISCONTINUED | OUTPATIENT
Start: 2023-01-23 | End: 2023-01-23 | Stop reason: HOSPADM

## 2023-01-23 RX ORDER — FAMOTIDINE 10 MG/ML
20 INJECTION INTRAVENOUS ONCE
Status: COMPLETED | OUTPATIENT
Start: 2023-01-23 | End: 2023-01-23

## 2023-01-23 RX ORDER — MEPERIDINE HYDROCHLORIDE 25 MG/ML
12.5 INJECTION INTRAMUSCULAR; INTRAVENOUS; SUBCUTANEOUS ONCE
Status: DISCONTINUED | OUTPATIENT
Start: 2023-01-23 | End: 2023-01-23 | Stop reason: HOSPADM

## 2023-01-23 RX ORDER — MIDAZOLAM HYDROCHLORIDE 1 MG/ML
2 INJECTION INTRAMUSCULAR; INTRAVENOUS ONCE AS NEEDED
Status: COMPLETED | OUTPATIENT
Start: 2023-01-23 | End: 2023-01-23

## 2023-01-23 RX ORDER — ENOXAPARIN SODIUM 100 MG/ML
INJECTION SUBCUTANEOUS
Status: COMPLETED
Start: 2023-01-23 | End: 2023-01-23

## 2023-01-23 RX ORDER — PROMETHAZINE HYDROCHLORIDE 25 MG/ML
INJECTION, SOLUTION INTRAMUSCULAR; INTRAVENOUS
Status: COMPLETED
Start: 2023-01-23 | End: 2023-01-23

## 2023-01-23 RX ORDER — IPRATROPIUM BROMIDE AND ALBUTEROL SULFATE 2.5; .5 MG/3ML; MG/3ML
3 SOLUTION RESPIRATORY (INHALATION) ONCE AS NEEDED
Status: DISCONTINUED | OUTPATIENT
Start: 2023-01-23 | End: 2023-01-23 | Stop reason: HOSPADM

## 2023-01-23 RX ORDER — ACETAMINOPHEN 500 MG
1000 TABLET ORAL ONCE
Status: COMPLETED | OUTPATIENT
Start: 2023-01-23 | End: 2023-01-23

## 2023-01-23 RX ORDER — METOCLOPRAMIDE HYDROCHLORIDE 5 MG/ML
10 INJECTION INTRAMUSCULAR; INTRAVENOUS EVERY 10 MIN PRN
Status: DISCONTINUED | OUTPATIENT
Start: 2023-01-23 | End: 2023-01-23 | Stop reason: HOSPADM

## 2023-01-23 RX ORDER — LIDOCAINE HYDROCHLORIDE 10 MG/ML
1 INJECTION, SOLUTION EPIDURAL; INFILTRATION; INTRACAUDAL; PERINEURAL ONCE
Status: DISCONTINUED | OUTPATIENT
Start: 2023-01-23 | End: 2023-01-23 | Stop reason: HOSPADM

## 2023-01-23 RX ORDER — BUPIVACAINE HYDROCHLORIDE 5 MG/ML
INJECTION, SOLUTION EPIDURAL; INTRACAUDAL
Status: DISCONTINUED | OUTPATIENT
Start: 2023-01-23 | End: 2023-01-23 | Stop reason: HOSPADM

## 2023-01-23 RX ORDER — SODIUM CHLORIDE 0.9 % (FLUSH) 0.9 %
SYRINGE (ML) INJECTION
Status: DISCONTINUED | OUTPATIENT
Start: 2023-01-23 | End: 2023-01-23 | Stop reason: HOSPADM

## 2023-01-23 RX ADMIN — PROMETHAZINE HYDROCHLORIDE 12.5 MG: 25 INJECTION, SOLUTION INTRAMUSCULAR; INTRAVENOUS at 05:01

## 2023-01-23 RX ADMIN — HYDROMORPHONE HYDROCHLORIDE 0.2 MG: 2 INJECTION INTRAMUSCULAR; INTRAVENOUS; SUBCUTANEOUS at 02:01

## 2023-01-23 RX ADMIN — HYDROMORPHONE HYDROCHLORIDE 0.4 MG: 2 INJECTION, SOLUTION INTRAMUSCULAR; INTRAVENOUS; SUBCUTANEOUS at 01:01

## 2023-01-23 RX ADMIN — LABETALOL HYDROCHLORIDE 10 MG: 5 INJECTION, SOLUTION INTRAVENOUS at 02:01

## 2023-01-23 RX ADMIN — PROPOFOL 200 MG: 10 INJECTION, EMULSION INTRAVENOUS at 12:01

## 2023-01-23 RX ADMIN — SODIUM CHLORIDE, SODIUM GLUCONATE, SODIUM ACETATE, POTASSIUM CHLORIDE AND MAGNESIUM CHLORIDE: 526; 502; 368; 37; 30 INJECTION, SOLUTION INTRAVENOUS at 12:01

## 2023-01-23 RX ADMIN — ROCURONIUM BROMIDE 50 MG: 50 INJECTION INTRAVENOUS at 12:01

## 2023-01-23 RX ADMIN — GLYCOPYRROLATE 0.2 MG: 0.2 INJECTION INTRAMUSCULAR; INTRAVENOUS at 01:01

## 2023-01-23 RX ADMIN — ALBUMIN HUMAN: 250 SOLUTION INTRAVENOUS at 01:01

## 2023-01-23 RX ADMIN — HYDROMORPHONE HYDROCHLORIDE 0.8 MG: 2 INJECTION, SOLUTION INTRAMUSCULAR; INTRAVENOUS; SUBCUTANEOUS at 02:01

## 2023-01-23 RX ADMIN — ONDANSETRON 4 MG: 2 INJECTION INTRAMUSCULAR; INTRAVENOUS at 04:01

## 2023-01-23 RX ADMIN — SUGAMMADEX 200 MG: 100 INJECTION, SOLUTION INTRAVENOUS at 01:01

## 2023-01-23 RX ADMIN — FENTANYL CITRATE 100 MCG: 50 INJECTION, SOLUTION INTRAMUSCULAR; INTRAVENOUS at 12:01

## 2023-01-23 RX ADMIN — DEXAMETHASONE SODIUM PHOSPHATE 8 MG: 4 INJECTION, SOLUTION INTRA-ARTICULAR; INTRALESIONAL; INTRAMUSCULAR; INTRAVENOUS; SOFT TISSUE at 12:01

## 2023-01-23 RX ADMIN — PHENYLEPHRINE HYDROCHLORIDE 25 MCG/MIN: 10 INJECTION INTRAVENOUS at 01:01

## 2023-01-23 RX ADMIN — HYDROMORPHONE HYDROCHLORIDE 0.2 MG: 2 INJECTION INTRAMUSCULAR; INTRAVENOUS; SUBCUTANEOUS at 03:01

## 2023-01-23 RX ADMIN — METOCLOPRAMIDE 10 MG: 5 INJECTION, SOLUTION INTRAMUSCULAR; INTRAVENOUS at 08:01

## 2023-01-23 RX ADMIN — LIDOCAINE HYDROCHLORIDE 80 MG: 20 INJECTION, SOLUTION EPIDURAL; INFILTRATION; INTRACAUDAL; PERINEURAL at 12:01

## 2023-01-23 RX ADMIN — ENOXAPARIN SODIUM: 30 INJECTION SUBCUTANEOUS at 07:01

## 2023-01-23 RX ADMIN — HYDROCODONE BITARTRATE AND ACETAMINOPHEN 1 TABLET: 5; 325 TABLET ORAL at 05:01

## 2023-01-23 RX ADMIN — MIDAZOLAM HYDROCHLORIDE 2 MG: 1 INJECTION, SOLUTION INTRAMUSCULAR; INTRAVENOUS at 11:01

## 2023-01-23 RX ADMIN — CEFAZOLIN SODIUM 2 G: 2 SOLUTION INTRAVENOUS at 12:01

## 2023-01-23 RX ADMIN — FAMOTIDINE 20 MG: 10 INJECTION INTRAVENOUS at 08:01

## 2023-01-23 RX ADMIN — ACETAMINOPHEN 1000 MG: 500 TABLET, FILM COATED ORAL at 08:01

## 2023-01-23 RX ADMIN — ONDANSETRON 4 MG: 2 INJECTION INTRAMUSCULAR; INTRAVENOUS at 01:01

## 2023-01-23 RX ADMIN — PROMETHAZINE HYDROCHLORIDE 12.5 MG: 25 INJECTION INTRAMUSCULAR; INTRAVENOUS at 05:01

## 2023-01-23 NOTE — ANESTHESIA POSTPROCEDURE EVALUATION
Anesthesia Post Evaluation    Patient: Tray Perez    Procedure(s) Performed: Procedure(s) (LRB):  RADIOFREQUENCY ABLATION, NEOPLASM, LIVER, LAPAROSCOPIC (N/A)  BIOPSY, LIVER, WITH US GUIDANCE (N/A)    Final Anesthesia Type: general      Patient location during evaluation: PACU  Level of consciousness: awake and alert  Post-procedure vital signs: reviewed and stable  Pain management: adequate  Airway patency: patent  TARI mitigation strategies: Multimodal analgesia  PONV status at discharge: No PONV  Anesthetic complications: no      Cardiovascular status: hemodynamically stable  Respiratory status: unassisted  Hydration status: euvolemic  Follow-up not needed.          Vitals Value Taken Time   /112 01/23/23 1433   Temp 36.8 01/23/23 1439   Pulse 65 01/23/23 1439   Resp 9 01/23/23 1439   SpO2 99 % 01/23/23 1439   Vitals shown include unvalidated device data.      No case tracking events are documented in the log.      Pain/Carrie Score: Pain Rating Prior to Med Admin: 3 (1/23/2023  8:30 AM)  Carrie Score: 7 (1/23/2023  2:10 PM)

## 2023-01-23 NOTE — DISCHARGE SUMMARY
[facility] Periop Services  Discharge Note  Short Stay    Procedure(s) (LRB):  RADIOFREQUENCY ABLATION, NEOPLASM, LIVER, LAPAROSCOPIC (N/A)  BIOPSY, LIVER, WITH US GUIDANCE (N/A)      OUTCOME: Patient tolerated treatment/procedure well without complication and is now ready for discharge.    DISPOSITION: Home or Self Care    FINAL DIAGNOSIS:  <principal problem not specified>    FOLLOWUP: In clinic    DISCHARGE INSTRUCTIONS:  No discharge procedures on file.      Clinical Reference Documents Added to Patient Instructions         Document    CONSTIPATION DISCHARGE INSTRUCTIONS, ADULT (ENGLISH)    HEPATIC RESECTION (ENGLISH)    LIVER BIOPSY (ENGLISH)    LIVER BIOPSY DISCHARGE INSTRUCTIONS (ENGLISH)            TIME SPENT ON DISCHARGE:  minutes

## 2023-01-23 NOTE — DISCHARGE INSTRUCTIONS
Keep follow up appt  Keep site clean and dry  May shower 1/25 do not submerge sites under water until after f/u appt  GO TO ER FOR SUDDEN/SEVERE CHANGES  Follow prescriptions    -NO driving and NO alcohol consumption for 24 hours and while taking narcotic pain medications.    -If you have a blue armband, you have received Exparel. Exparel is a medication that is used to ease pain at the incision sites. It can last up to 5 days. (see attached)    -Keep sites clean and dry for 2 days. Ok to shower afterwards. Do not submerge sites under water.    -NO heavy lifting. Do not lift objects greater than 10 lbs. Use caution when bending, pulling, pushing, lifting.    -Monitor sites for infection: redness, swelling, drainage/pus/foul odor, fever, chills.    -Report to your nearest ER if you experience and/or notify your provider if you experience any SUDDEN/SEVERE chest/abdominal pain, weakness, trouble breathing, uncontrolled pain.

## 2023-01-23 NOTE — ANESTHESIA PREPROCEDURE EVALUATION
01/23/2023  Tray Perez is a 47 y.o., male presenting for robotic partial resection of Liver vs RF Ablation of tumor.    Other Medical History   Hypertension IBS (irritable bowel syndrome)   Urinary tract infection Anxiety   Depression GERD (gastroesophageal reflux disease)   Colitis Ndiaye's esophagus   Liver cancer Epigastric pain   Diarrhea Bloody stool   Nausea & vomiting Kidney stone   Arthritis Migraine   Obesity    Smoker      Pre-op Assessment    I have reviewed the Patient Summary Reports.     I have reviewed the Nursing Notes. I have reviewed the NPO Status.   I have reviewed the Medications.     Review of Systems  Anesthesia Hx:  No problems with previous Anesthesia    Social:  MJ   Hematology/Oncology:        Current/Recent Cancer.   Cardiovascular:   Hypertension    Renal/:   Chronic Renal Disease    Hepatic/GI:   GERD Liver Disease,    Neurological:   Headaches    Endocrine:  Obesity / BMI > 30  Psych:   Psychiatric History anxiety          Physical Exam  General: Well nourished, Cooperative, Alert and Oriented    Airway:  Mallampati: II   Mouth Opening: Normal  TM Distance: Normal  Tongue: Normal  Neck ROM: Normal ROM    Dental:  Intact    Chest/Lungs:  Clear to auscultation, Normal Respiratory Rate    Heart:  Rate: Normal  Rhythm: Regular Rhythm  Sounds: Normal    Abdomen:  Normal, Soft, Nontender        Anesthesia Plan  Type of Anesthesia, risks & benefits discussed:    Anesthesia Type: Gen ETT  Intra-op Monitoring Plan: Standard ASA Monitors and Art Line  Post Op Pain Control Plan: multimodal analgesia  Induction:  IV  Airway Plan: Direct  Informed Consent: Informed consent signed with the Patient and all parties understand the risks and agree with anesthesia plan.  All questions answered. Patient consented to blood products? Yes  ASA Score: 3  Day of Surgery Review of History &  Physical: H&P Update referred to the surgeon/provider.  Anesthesia Plan Notes: 2nd PIV, Art Line    Ready For Surgery From Anesthesia Perspective.     .

## 2023-01-23 NOTE — ANESTHESIA PROCEDURE NOTES
Intubation    Date/Time: 1/23/2023 12:36 PM  Performed by: Gigi Giron CRNA  Authorized by: Tito Keane DO     Intubation:     Induction:  Intravenous    Intubated:  Postinduction    Mask Ventilation:  Easy mask    Attempts:  2    Attempted By:  Student    Blade:  Hunter 2    Laryngeal View Grade: Grade III - only epiglottis visible      Attempted By (2nd Attempt):  Student    Method of Intubation (2nd Attempt):  Video laryngoscopy    Blade (2nd Attempt):  Aury 4    Laryngeal View Grade (2nd Attempt): Grade IIb - only the arytenoids and epiglottis seen      Difficult Airway Encountered?: No      Complications:  None    Airway Device:  Oral endotracheal tube    Airway Device Size:  7.5    Style/Cuff Inflation:  Cuffed (inflated to minimal occlusive pressure)    Inflation Amount (mL):  7    Tube secured:  24    Secured at:  The lips    Placement Verified By:  Capnometry    Complicating Factors:  None    Findings Post-Intubation:  BS equal bilateral and atraumatic/condition of teeth unchanged  Notes:      Anatomy deep with small glottic opening, lots of redundant tissue which complicated DL. Suggest Glidescope, Jeffers 4, or Hunter 3 for future GETA

## 2023-01-23 NOTE — ANESTHESIA POSTPROCEDURE EVALUATION
Anesthesia Post Evaluation    Patient: Tray Perez    Procedure(s) Performed: Procedure(s) (LRB):  RADIOFREQUENCY ABLATION, NEOPLASM, LIVER, LAPAROSCOPIC (N/A)  BIOPSY, LIVER, WITH US GUIDANCE (N/A)    [facility] Anesthesia Post Op Evaluation      Vitals Value Taken Time   /112 01/23/23 1433   Temp 36.8 01/23/23 1439   Pulse 58 01/23/23 1438   Resp 9 01/23/23 1438   SpO2 91 % 01/23/23 1438   Vitals shown include unvalidated device data.      No case tracking events are documented in the log.      Pain/Carrie Score: Pain Rating Prior to Med Admin: 3 (1/23/2023  8:30 AM)  Carrie Score: 7 (1/23/2023  2:10 PM)

## 2023-01-23 NOTE — TRANSFER OF CARE
"Anesthesia Transfer of Care Note    Patient: Tray Perez    Procedure(s) Performed: Procedure(s) (LRB):  RADIOFREQUENCY ABLATION, NEOPLASM, LIVER, LAPAROSCOPIC (N/A)  BIOPSY, LIVER, WITH US GUIDANCE (N/A)    Patient location: PACU    Anesthesia Type: general    Transport from OR: Transported from OR on room air with adequate spontaneous ventilation    Post pain: adequate analgesia    Post assessment: no apparent anesthetic complications    Post vital signs: stable    Level of consciousness: awake    Nausea/Vomiting: no nausea/vomiting    Complications: none    Transfer of care protocol was followed      Last vitals:   Visit Vitals  BP (!) 151/83   Pulse 66   Temp 37 °C (98.6 °F) (Oral)   Resp 19   Ht 6' 2" (1.88 m)   Wt (!) 138.3 kg (305 lb)   SpO2 98%   BMI 39.16 kg/m²     "

## 2023-01-23 NOTE — ANESTHESIA PROCEDURE NOTES
Peripheral IV Insertion    Diagnosis: I99.8 Other disorder of circulatory system    Patient location during procedure: OR  Procedure start time: 1/23/2023 12:43 PM  Timeout: 1/23/2023 12:42 PM  Procedure end time: 1/23/2023 12:48 PM    Staffing  Authorizing Provider: Tito Keane DO  Performing Provider: Gigi Giron CRNA    Anesthesiologist was present at the time of the procedure.    Preanesthetic Checklist  Completed: patient identified, IV checked, site marked, risks and benefits discussed, surgical consent, monitors and equipment checked, pre-op evaluation, timeout performed and anesthesia consent givenPeripheral IV Insertion  Skin Prep: alcohol swabs  Orientation: right  Location: upper arm  Catheter Type: peripheral IV (single lumen)  Catheter Size: 20 G Insertion Attempts: 1  Assessment  Dressing: secured with tape and tegaderm  Patient: Tolerated well  Line flushed easily.

## 2023-01-23 NOTE — ANESTHESIA PROCEDURE NOTES
Arterial    Diagnosis: intraoperative monitoring    Patient location during procedure: holding area  Procedure start time: 1/23/2023 11:21 AM  Timeout: 1/23/2023 11:20 AM  Procedure end time: 1/23/2023 11:30 AM    Staffing  Authorizing Provider: Tito Keane DO  Performing Provider: Jessica Broussard      Preanesthetic Checklist  Completed: patient identified, IV checked, site marked, risks and benefits discussed, surgical consent, monitors and equipment checked, pre-op evaluation, timeout performed and anesthesia consent givenArterial  Skin Prep: chlorhexidine gluconate  Local Infiltration: lidocaine  Orientation: right  Location: radial    Catheter Size: 20 G  Catheter placement by Ultrasound guidance. Heme positive aspiration all ports.   Vessel Caliber: medium, patent, compressibility normal  Needle advanced into vessel with real time Ultrasound guidance.  Assessment  Dressing: secured with tape and tegaderm  Patient: Tolerated well

## 2023-01-24 LAB — PSYCHE PATHOLOGY RESULT: NORMAL

## 2023-01-24 NOTE — OP NOTE
Date of Surgery:  January 23, 2023    Surgeon:  Shelton Hector MD    Assistant:  None                                      Pre-op Diagnosis:  Left lobe liver lesion/small vessel neoplasm    Post-op Diagnosis:  Same    Procedure:    1. Laparoscopic assisted ultrasound-guided microwave ablation left lobe liver lesion   2. Complete intraoperative diagnostic ultrasound of the liver   3. Diagnostic laparoscopy    4. Ultrasound-guided core needle biopsy of the liver     Anesthesia:  GETA    EBL:  Less than 50 cc    Specimens:  Core needle biopsy left lobe liver lesion    Findings:  Initial diagnostic laparoscopy revealed no evidence of metastatic disease that would preclude an aggressive approach to the tumor in the liver.  Complete intraoperative ultrasound of the liver revealed a 3.5 cm heterogeneous lesion in segment 3 abutting the umbilical fissure, no other intra hepatic liver lesions were seen.  Given the central location of the lesion in the left lobe of the liver, and it is likely benign characteristics I did not feel that left hepatic lobectomy was necessary.  I elected to proceed with microwave ablation after ultrasound-guided core needle biopsy to confirm diagnosis was performed.  Ablation was performed using the [employer] SR Probe, 8 minute ablation at 140 w.  Completion ultrasound revealed complete destruction of the lesion.    Procedure in detail:  After informed consent was obtained the patient was brought to the operating room placed supine position.  General endotracheal anesthesia was administered without difficulty.  The patient's abdomen is prepped and draped in sterile fashion.  Right upper quadrant incision was made optical trocar used into the peritoneal cavity under direct vision.  Pneumoperitoneum was achieved without difficulty.  Additional port was placed under direct vision.  The abdomen was explored laparoscopically visualizing all visceral and parietal peritoneal surfaces as well as carefully  examined the liver and emam hepatis.  There was no evidence of metastatic disease that would preclude an aggressive approach to his primary tumor.  Using the laparoscopic probe and a saline immersion technique, I performed complete intraoperative diagnostic ultrasound of the liver visualizing all segments.  In segment 3 abutting the umbilical fissure there was a well-circumscribed heterogeneous mass with internal color flow consistent and concordant with preoperative imaging.  There were no other liver lesions seen, no other suspicious findings.  Under ultrasound guidance I performed core needle biopsy of the lesion to further confirm the diagnosis.  After measurements were taken with ultrasound, I selected the SR probe.  The probe was carefully meticulously placed using the ultrasound guidance with care to avoid major vasculature.  Once the probe was in excellent position, an 8 minute microwave ablation was performed at 140 w.  Tract ablation was performed on retraction of the needle and excellent hemostasis was noted at the needle puncture site.  Completion ultrasound revealed complete destruction of the lesion with no residual tumor noted.  Color flow revealed preserved inflow and outflow to segments 2 and 3 of the liver.  The abdomen was inspected for hemostasis which appeared to be excellent.  Ports removed pneumoperitoneum was relieved, port sites were closed with absorbable suture and sterile dressings were applied.  The patient tolerated the procedure well.      Shelton Hector MD  Surgical Oncology  Complex General, Gastrointestinal and Hepatobiliary Surgery

## 2023-02-07 ENCOUNTER — DOCUMENTATION ONLY (OUTPATIENT)
Dept: SURGICAL ONCOLOGY | Facility: CLINIC | Age: 48
End: 2023-02-07
Payer: MEDICAID

## 2023-02-07 NOTE — PROGRESS NOTES
Carmelo Perez, RN  Caller: Unspecified (Today,  3:10 PM)  Good Morning Ma'am,       I've just spoken with Dr. Hector's pt and he's refusing to come for his appt tomorrow. The pt had surgery and this is his 2 wk po appt. Please give the pt a call back whenever you get a chance. Thank you.     : 77   Phone: [phone]           1515:  Spoke with patient, explained the need for in person post op visit.  He agrees and will be here for appt 23 at 1030.

## 2023-02-08 ENCOUNTER — OFFICE VISIT (OUTPATIENT)
Dept: SURGICAL ONCOLOGY | Facility: CLINIC | Age: 48
End: 2023-02-08
Payer: MEDICAID

## 2023-02-08 DIAGNOSIS — R16.0 LIVER MASS: Primary | ICD-10-CM

## 2023-02-08 PROCEDURE — 99024 PR POST-OP FOLLOW-UP VISIT: ICD-10-PCS | Mod: ,,, | Performed by: NURSE PRACTITIONER

## 2023-02-08 PROCEDURE — 99024 POSTOP FOLLOW-UP VISIT: CPT | Mod: ,,, | Performed by: NURSE PRACTITIONER

## 2023-03-31 ENCOUNTER — PATIENT MESSAGE (OUTPATIENT)
Dept: FAMILY MEDICINE | Facility: CLINIC | Age: 48
End: 2023-03-31
Payer: MEDICAID

## 2023-05-25 ENCOUNTER — PATIENT MESSAGE (OUTPATIENT)
Dept: PRIMARY CARE CLINIC | Facility: CLINIC | Age: 48
End: 2023-05-25
Payer: MEDICAID

## 2023-05-25 ENCOUNTER — TELEPHONE (OUTPATIENT)
Dept: PRIMARY CARE CLINIC | Facility: CLINIC | Age: 48
End: 2023-05-25
Payer: MEDICAID

## 2023-05-25 NOTE — TELEPHONE ENCOUNTER
Patient's call referred to me from the call center and he is looking for a sooner appt than Aug. Message sent to Mrs Marleni SHAFERZulema     ----- Message from Carol Ann Vogel sent at 5/25/2023 11:27 AM CDT -----  Contact: Patient  Patient called to consult with nurse or staff regarding a question he has for the office. He would like a call back and can be reached at [phone]. Thanks/MR

## 2023-06-07 ENCOUNTER — OFFICE VISIT (OUTPATIENT)
Dept: PRIMARY CARE CLINIC | Facility: CLINIC | Age: 48
End: 2023-06-07
Payer: MEDICAID

## 2023-06-07 VITALS
BODY MASS INDEX: 37.86 KG/M2 | DIASTOLIC BLOOD PRESSURE: 90 MMHG | HEART RATE: 71 BPM | HEIGHT: 74 IN | SYSTOLIC BLOOD PRESSURE: 150 MMHG | OXYGEN SATURATION: 99 % | WEIGHT: 295 LBS

## 2023-06-07 DIAGNOSIS — K21.9 GASTROESOPHAGEAL REFLUX DISEASE, UNSPECIFIED WHETHER ESOPHAGITIS PRESENT: ICD-10-CM

## 2023-06-07 DIAGNOSIS — R16.0 LIVER MASS: Primary | ICD-10-CM

## 2023-06-07 DIAGNOSIS — E66.9 CLASS 2 OBESITY WITH BODY MASS INDEX (BMI) OF 37.0 TO 37.9 IN ADULT, UNSPECIFIED OBESITY TYPE, UNSPECIFIED WHETHER SERIOUS COMORBIDITY PRESENT: ICD-10-CM

## 2023-06-07 DIAGNOSIS — I10 ESSENTIAL HYPERTENSION: ICD-10-CM

## 2023-06-07 DIAGNOSIS — F41.9 ANXIETY: ICD-10-CM

## 2023-06-07 PROCEDURE — 3080F DIAST BP >= 90 MM HG: CPT | Mod: CPTII,S$GLB,, | Performed by: INTERNAL MEDICINE

## 2023-06-07 PROCEDURE — 99214 PR OFFICE/OUTPT VISIT, EST, LEVL IV, 30-39 MIN: ICD-10-PCS | Mod: S$GLB,,, | Performed by: INTERNAL MEDICINE

## 2023-06-07 PROCEDURE — 3080F PR MOST RECENT DIASTOLIC BLOOD PRESSURE >= 90 MM HG: ICD-10-PCS | Mod: CPTII,S$GLB,, | Performed by: INTERNAL MEDICINE

## 2023-06-07 PROCEDURE — 3077F SYST BP >= 140 MM HG: CPT | Mod: CPTII,S$GLB,, | Performed by: INTERNAL MEDICINE

## 2023-06-07 PROCEDURE — 1159F MED LIST DOCD IN RCRD: CPT | Mod: CPTII,S$GLB,, | Performed by: INTERNAL MEDICINE

## 2023-06-07 PROCEDURE — 99214 OFFICE O/P EST MOD 30 MIN: CPT | Mod: S$GLB,,, | Performed by: INTERNAL MEDICINE

## 2023-06-07 PROCEDURE — 4010F PR ACE/ARB THEARPY RXD/TAKEN: ICD-10-PCS | Mod: CPTII,S$GLB,, | Performed by: INTERNAL MEDICINE

## 2023-06-07 PROCEDURE — 1159F PR MEDICATION LIST DOCUMENTED IN MEDICAL RECORD: ICD-10-PCS | Mod: CPTII,S$GLB,, | Performed by: INTERNAL MEDICINE

## 2023-06-07 PROCEDURE — 3008F PR BODY MASS INDEX (BMI) DOCUMENTED: ICD-10-PCS | Mod: CPTII,S$GLB,, | Performed by: INTERNAL MEDICINE

## 2023-06-07 PROCEDURE — 4010F ACE/ARB THERAPY RXD/TAKEN: CPT | Mod: CPTII,S$GLB,, | Performed by: INTERNAL MEDICINE

## 2023-06-07 PROCEDURE — 3077F PR MOST RECENT SYSTOLIC BLOOD PRESSURE >= 140 MM HG: ICD-10-PCS | Mod: CPTII,S$GLB,, | Performed by: INTERNAL MEDICINE

## 2023-06-07 PROCEDURE — 3008F BODY MASS INDEX DOCD: CPT | Mod: CPTII,S$GLB,, | Performed by: INTERNAL MEDICINE

## 2023-06-07 NOTE — PROGRESS NOTES
Subjective:      Patient ID: Tray Perez is a 48 y.o. male.    Chief Complaint: Follow-up (Follow up from liver CA surgery at the end of Jan 2023)    HPI    Past Medical History:   Diagnosis Date    Anxiety     Arthritis     Lazo's esophagus     Bloody stool     Colitis     Depression     Diarrhea     Epigastric pain     GERD (gastroesophageal reflux disease)     Hypertension     IBS (irritable bowel syndrome)     Kidney stone     Liver cancer     Migraine     Nausea & vomiting     Obesity     Urinary tract infection      Follow up from Dr Shelton Wade   47 year-old-male referred by Dr. Royal.  Patient has had complaints of passing blood in stools and abdominal cramping x several years.  MRI of the abdomen was done in August 2020, I personally reviewed and interpreted the images, which showed a 4.7 cm mass in the left lobe of the liver.  He did not pursue follow up of the mass due to family matters and COVID.  He underwent liver biopsy in Sept 2022.  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  Follow up MRI abdomen done in Nov showed  a slightly smaller left lobe liver lesion.  CT chest negative.    Patient had lap assisted microablation, no follow up imaging was needed but mother wants to make sure the lesion has not returned. He had reported blood in stools and he was referred to Dr Mcginnis who was his previous GI physician but the referral was cancelled. He states he would like a GI physician locally   He has been referred to Dr Wen in the past as well around 2020. He has a h/o lazo's esophagus and states his last EGD was 5 years ago          Review of Systems   Constitutional:  Negative for chills and fever.   HENT:  Negative for hearing loss.    Eyes:  Negative for blurred vision.   Respiratory:  Negative for cough, shortness of breath and wheezing.    Cardiovascular:  Negative for chest pain, palpitations and leg swelling.   Gastrointestinal:  Positive for blood in stool. Negative  "for abdominal pain, constipation, diarrhea, melena, nausea and vomiting.   Genitourinary:  Negative for dysuria, frequency and urgency.   Musculoskeletal:  Negative for falls.   Skin:  Negative for rash.   Neurological:  Negative for dizziness and headaches.   Endo/Heme/Allergies:  Does not bruise/bleed easily.   Psychiatric/Behavioral:  Negative for depression. The patient is not nervous/anxious.    Objective:     Physical Exam  Vitals reviewed.   Constitutional:       Appearance: Normal appearance.   HENT:      Head: Normocephalic.      Mouth/Throat:      Mouth: Mucous membranes are moist.      Pharynx: Oropharynx is clear.   Eyes:      Extraocular Movements: Extraocular movements intact.      Conjunctiva/sclera: Conjunctivae normal.      Pupils: Pupils are equal, round, and reactive to light.   Cardiovascular:      Rate and Rhythm: Normal rate and regular rhythm.   Pulmonary:      Effort: Pulmonary effort is normal.      Breath sounds: Normal breath sounds.   Abdominal:      General: Bowel sounds are normal.   Musculoskeletal:      Right lower leg: No edema.      Left lower leg: No edema.   Skin:     General: Skin is warm.      Capillary Refill: Capillary refill takes less than 2 seconds.   Neurological:      General: No focal deficit present.      Mental Status: He is alert.   Psychiatric:         Mood and Affect: Mood normal.     BP (!) 150/90 (BP Location: Left arm, Patient Position: Sitting, BP Method: Medium (Manual))   Pulse 71   Ht 6' 2" (1.88 m)   Wt 133.8 kg (295 lb)   SpO2 99%   BMI 37.88 kg/m²     Assessment:       ICD-10-CM ICD-9-CM   1. Liver mass  R16.0 573.8   2. Essential hypertension  I10 401.9   3. Anxiety  F41.9 300.00   4. Class 2 obesity with body mass index (BMI) of 37.0 to 37.9 in adult, unspecified obesity type, unspecified whether serious comorbidity present  E66.9 278.00    Z68.37 V85.37   5. Gastroesophageal reflux disease, unspecified whether esophagitis present  K21.9 530.81     "   Plan:     Medication List with Changes/Refills   Current Medications    ACETAMINOPHEN (TYLENOL) 500 MG TABLET    Take 1,000 mg by mouth every 6 (six) hours as needed for Pain.    CETIRIZINE (ZYRTEC) 10 MG TABLET    Take 10 mg by mouth daily as needed for Allergies.    CITALOPRAM (CELEXA) 10 MG TABLET    Take 1 tablet (10 mg total) by mouth once daily.    HYDROCODONE-ACETAMINOPHEN (NORCO) 5-325 MG PER TABLET    Take 1 tablet by mouth every 4 (four) hours as needed.    HYDROCODONE-ACETAMINOPHEN (NORCO) 7.5-325 MG PER TABLET    Take 1 tablet by mouth every 6 (six) hours as needed for Pain.    LISINOPRIL 10 MG TABLET    Take 1 tablet (10 mg total) by mouth once daily.    ONDANSETRON (ZOFRAN) 8 MG TABLET    Take 8 mg by mouth every 8 (eight) hours as needed for Nausea.        1. Liver mass  Overview:  CT scan abd with contrast scheduled for next monday, refer to GI    Orders:  -     Ambulatory referral/consult to Hematology / Oncology; Future; Expected date: 06/14/2023  -     CBC Auto Differential; Future; Expected date: 06/07/2023  -     Comprehensive Metabolic Panel; Future; Expected date: 06/07/2023  -     Lipid Panel; Future; Expected date: 06/07/2023  -     US Abdomen Limited; Future; Expected date: 06/07/2023    2. Essential hypertension  Overview:  restart Lisinopril as directed, goal b/p <140/90      3. Anxiety    4. Class 2 obesity with body mass index (BMI) of 37.0 to 37.9 in adult, unspecified obesity type, unspecified whether serious comorbidity present    5. Gastroesophageal reflux disease, unspecified whether esophagitis present  -     Ambulatory referral/consult to Gastroenterology; Future; Expected date: 06/14/2023       Patient was given names and numbers of referrals, instructed to follow up with referrals if he has not received a call in 2 weeks. Mother feels if I refer him to any doctor they will take him as a patient so I advised she call Dr Villalobos office and inquire about referral    Mother has  fibromyalgia and feels it is genetic and he reports some subcutaneous nodules and feels he has fibromyalgia        Assistance with smoking cessation was offered, including:  []  Medications  [x]  Counseling  []  Printed Information on Smoking Cessation  []  Referral to a Smoking Cessation Program    Patient was counseled regarding smoking for 3-10 minutes.

## 2023-06-08 LAB
ABS NRBC COUNT: 0 THOU/UL (ref 0–0.01)
ABSOLUTE BASOPHIL: 0 10*3/UL (ref 0–0.3)
ABSOLUTE EOSINOPHIL: 0.1 10*3/UL (ref 0–0.6)
ABSOLUTE IMMATURE GRAN: 0.01 THOU/UL (ref 0–0.03)
ABSOLUTE LYMPHOCYTE: 2.7 10*3/UL (ref 1.2–4)
ABSOLUTE MONOCYTE: 0.5 10*3/UL (ref 0.1–0.8)
ALBUMIN SERPL BCP-MCNC: 4 G/DL (ref 3.4–5)
ALP SERPL-CCNC: 71 U/L (ref 45–117)
ALT SERPL W P-5'-P-CCNC: 41 U/L (ref 16–61)
ANION GAP SERPL CALC-SCNC: 4 MMOL/L (ref 3–11)
AST SERPL-CCNC: 21 U/L (ref 15–37)
BASOPHILS NFR BLD: 0.5 % (ref 0–3)
BILIRUB SERPL-MCNC: 0.7 MG/DL (ref 0.2–1)
BUN SERPL-MCNC: 12 MG/DL (ref 7–18)
BUN/CREAT SERPL: 11.53 RATIO
CALCIUM SERPL-MCNC: 9 MG/DL (ref 8.5–10.1)
CHLORIDE SERPL-SCNC: 102 MMOL/L (ref 98–107)
CHOLEST SERPL-MSCNC: 170 MG/DL
CO2 SERPL-SCNC: 27 MMOL/L (ref 21–32)
CREAT SERPL-MCNC: 1.04 MG/DL (ref 0.7–1.3)
EOSINOPHIL NFR BLD: 1.6 % (ref 0–6)
ERYTHROCYTE [DISTWIDTH] IN BLOOD BY AUTOMATED COUNT: 13.2 % (ref 0–15.5)
GFR ESTIMATION: > 60
GLUCOSE SERPL-MCNC: 118 MG/DL (ref 74–106)
HCT VFR BLD AUTO: 46 % (ref 42–52)
HDLC SERPL-MCNC: 26 MG/DL
HGB BLD-MCNC: 15 G/DL (ref 14–18)
IMMATURE GRANULOCYTES: 0.1 % (ref 0–0.43)
LDLC SERPL CALC-MCNC: 78.8 MG/DL
LYMPHOCYTES NFR BLD: 33.2 % (ref 20–45)
MCH RBC QN AUTO: 27 PG (ref 27–32)
MCHC RBC AUTO-ENTMCNC: 32.6 % (ref 32–36)
MCV RBC AUTO: 82.7 FL (ref 80–97)
MONOCYTES NFR BLD: 5.7 % (ref 2–10)
NEUTROPHILS # BLD AUTO: 4.8 10*3/UL (ref 1.4–7)
NEUTROPHILS NFR BLD: 58.9 % (ref 50–80)
NUCLEATED RED BLOOD CELLS: 0 % (ref 0–0.2)
PLATELETS: 203 10*3/UL (ref 130–400)
PMV BLD AUTO: 10.9 FL (ref 9.2–12.2)
POTASSIUM SERPL-SCNC: 3.9 MMOL/L (ref 3.5–5.1)
PROT SERPL-MCNC: 7.2 G/DL (ref 6.4–8.2)
RBC # BLD AUTO: 5.56 10*6/UL (ref 4.7–6.1)
SODIUM BLD-SCNC: 133 MMOL/L (ref 131–143)
TRIGL SERPL-MCNC: 326 MG/DL (ref 0–149)
VLDL CHOLESTEROL: 65 MG/DL
WBC # BLD: 8.1 10*3/UL (ref 4.5–10)

## 2023-06-29 ENCOUNTER — TELEPHONE (OUTPATIENT)
Dept: PRIMARY CARE CLINIC | Facility: CLINIC | Age: 48
End: 2023-06-29
Payer: MEDICAID

## 2023-06-29 NOTE — TELEPHONE ENCOUNTER
Inna has taken care of this phone call.     ----- Message from Carol Ann Vogel sent at 6/29/2023 10:46 AM CDT -----  Contact: Bing([facility])  Bing called to consult with nurse or staff regarding the patients referrals. She states they never received the referrals for the patient and states that he hasn't been contacted to schedule.  The patient would like a call back regarding the status of these referrals and can be reached at [phone]. Thanks/MR

## 2023-06-30 ENCOUNTER — TELEPHONE (OUTPATIENT)
Dept: PRIMARY CARE CLINIC | Facility: CLINIC | Age: 48
End: 2023-06-30
Payer: MEDICAID

## 2023-06-30 NOTE — TELEPHONE ENCOUNTER
I was out of the office on yesterday and Inna GRIDER, did speak with his nurse.     ----- Message from Daisy Sundar sent at 6/29/2023  1:16 PM CDT -----  Contact: [place]/Dr Royal's office (oncology)  Type: Staff Message    Who called: [place]/Dr Royal's office (oncology)  Call back number: Personal Cell [phone]  Message: Please call.

## 2023-07-03 ENCOUNTER — TELEPHONE (OUTPATIENT)
Dept: PRIMARY CARE CLINIC | Facility: CLINIC | Age: 48
End: 2023-07-03
Payer: MEDICAID

## 2023-07-03 ENCOUNTER — PATIENT MESSAGE (OUTPATIENT)
Dept: PRIMARY CARE CLINIC | Facility: CLINIC | Age: 48
End: 2023-07-03
Payer: MEDICAID

## 2023-07-10 ENCOUNTER — TELEPHONE (OUTPATIENT)
Dept: PRIMARY CARE CLINIC | Facility: CLINIC | Age: 48
End: 2023-07-10
Payer: MEDICAID

## 2023-07-10 NOTE — TELEPHONE ENCOUNTER
There is no extension to speak to Anais.  I am speaking to Jo Ann who is trying to find the information. She states the referral had not been uploaded yet and may take up to 24 hrs. I gave her an update on Dr Royal and the referral to Dr Wen. She did verbalize understanding.   Reference #[id number].     ----- Message from Charlotte Bajwa sent at 7/10/2023 10:17 AM CDT -----  Regarding: [facility] Medicaid  Contact: Anais Manzo/[employer] is calling to speak to nurse regarding pt referral to HEM/OC. Please call back at [phone]//thank you acc

## 2023-07-10 NOTE — TELEPHONE ENCOUNTER
----- Message from Daisy Vernon sent at 7/10/2023  9:19 AM CDT -----  Contact: self  Type:  Patient Requesting Referral    Who Called: Tray Perez   Does the patient already have the specialty appointment scheduled?: N/a  If yes, what is the date of that appointment?: N/a  Referral to What Specialty: Oncology  Reason for Referral: Liver surgery, follow up appointments  Does the patient want the referral with a specific physician?: No  Is the specialist an [facility]?: N/a  Patient Requesting a Response?: Yes  Would the patient rather a call back or a response via [facility]?  Call back  Best Call Back Number: [phone]   Additional Information: Caller states the patient is unhappy with the oncologist he was referred to and would like another.

## 2023-07-11 DIAGNOSIS — R16.0 LIVER MASS: Primary | ICD-10-CM

## 2023-07-13 ENCOUNTER — TELEPHONE (OUTPATIENT)
Dept: PRIMARY CARE CLINIC | Facility: CLINIC | Age: 48
End: 2023-07-13
Payer: MEDICAID

## 2023-07-13 ENCOUNTER — TELEPHONE (OUTPATIENT)
Dept: HEMATOLOGY/ONCOLOGY | Facility: CLINIC | Age: 48
End: 2023-07-13
Payer: MEDICAID

## 2023-07-13 NOTE — TELEPHONE ENCOUNTER
The patient states he was told that Dr Wagner's office is accepting patient and he spoke to them about it this am. He is just needing the referral.     ----- Message from Alma Langley sent at 7/13/2023 10:04 AM CDT -----  Carrie levin/[facility] calling about referral sent to oncology in [place].  Referral needs go to Sebastian Wagner.  Pt can be reached at [phone].  Pt request a call back when referral is sent and requesting it be expedited.      Thanks,

## 2023-07-13 NOTE — TELEPHONE ENCOUNTER
----- Message from Dahiana Hannah sent at 7/13/2023  9:36 AM CDT -----  Regarding: referral  Contact: PT  PT is calling to get an update on the referral that was sent over for him to be seen, he states he is in pain where he had his surgery and has not been given a clean bill of health and wants to know how long it will be before he gets an appt, return call [phone]

## 2023-07-14 ENCOUNTER — TELEPHONE (OUTPATIENT)
Dept: HEMATOLOGY/ONCOLOGY | Facility: CLINIC | Age: 48
End: 2023-07-14
Payer: MEDICAID

## 2023-07-14 NOTE — TELEPHONE ENCOUNTER
Left voicemail for rep stating that Dr Wagner is not accepting any new patients and the patient's pcp has referred him to onc in [place]      ----- Message from Corie Joy sent at 7/14/2023 10:40 AM CDT -----  Contact: [facility] rep ask that she be called back at [phone] ext [phone]. Pt has been without care for medical needs . Please also call pt to inform him of a day and time at [phone]

## 2023-07-17 ENCOUNTER — TELEPHONE (OUTPATIENT)
Dept: HEMATOLOGY/ONCOLOGY | Facility: CLINIC | Age: 48
End: 2023-07-17
Payer: MEDICAID

## 2023-07-17 NOTE — TELEPHONE ENCOUNTER
"  Spoke to patient to notify him again that the referral to Oncology was placed to [facility].  Patient was insistent that he was on a 3 way call with his insurance company and told that we would see him, and only needed a referral.  Patient couldn't state who he spoke to, just stated Dr. Wagner's office.  He stated his insurance will get him a provider within 60 miles of his home and [facility] is 75 miles away.  He stated he will contact his insurance company and have them address getting his appointment with this office.  Offered patient my direct number and he refused stating "he didn't want to speak to me, he wanted to speak directly to someone in the office."  Once again I reiterated my name, position and the clinic information, then the patient hung up the phone.    LO 7/17/23 @9:18am  ----- Message from Faith Gray RN sent at 7/17/2023  9:09 AM CDT -----  Regarding: FW: pt advice  Contact: pt    ----- Message -----  From: Charlotte Bajwa  Sent: 7/17/2023   9:07 AM CDT  To: Bernard Cortes Staff  Subject: pt advice                                        Pt is calling to check status on his referral. Please call back at [phone]//thank you acc      "

## 2023-07-18 ENCOUNTER — TELEPHONE (OUTPATIENT)
Dept: PRIMARY CARE CLINIC | Facility: CLINIC | Age: 48
End: 2023-07-18
Payer: MEDICAID

## 2023-07-21 ENCOUNTER — PATIENT MESSAGE (OUTPATIENT)
Dept: PRIMARY CARE CLINIC | Facility: CLINIC | Age: 48
End: 2023-07-21
Payer: MEDICAID

## 2023-08-02 ENCOUNTER — TELEPHONE (OUTPATIENT)
Dept: PRIMARY CARE CLINIC | Facility: CLINIC | Age: 48
End: 2023-08-02
Payer: MEDICAID

## 2023-08-02 NOTE — TELEPHONE ENCOUNTER
Inna did you fax the medical records release. I see it was scanned in.     ----- Message from Savana Olivera LPN sent at 8/1/2023  4:32 PM CDT -----  Contact: No/[facility]  Did we receive a request for records?    ----- Message -----  From: Reva Acosta MA  Sent: 7/31/2023   2:54 PM CDT  To: Charlotte Musa RN; #      ----- Message -----  From: Brenna Day  Sent: 7/31/2023   1:17 PM CDT  To: Aidan Saldivar Staff    No is calling regard to the status of the quality of care request that was sent over on 7/17/23, Please call her at [phone], and or fax it to [phone].    Thanks  Td

## 2023-08-02 NOTE — TELEPHONE ENCOUNTER
----- Message from Savana Olivera LPN sent at 8/1/2023  4:39 PM CDT -----  Contact: No/[facility]  I got it Friday and sent it in, I also called No to let her know it was sent and to call Medical Records at [phone] for a status update    ----- Message -----  From: Savana Olivera LPN  Sent: 8/1/2023   4:32 PM CDT  To: Reva Acosta MA    Did we receive a request for records?    ----- Message -----  From: Reva Acosta MA  Sent: 7/31/2023   2:54 PM CDT  To: Charlotte Musa RN; #      ----- Message -----  From: Brenna Day  Sent: 7/31/2023   1:17 PM CDT  To: Aidan Sarabia is calling regard to the status of the quality of care request that was sent over on 7/17/23, Please call her at [phone], and or fax it to [phone].    Thanks  Td

## 2023-08-07 ENCOUNTER — TELEPHONE (OUTPATIENT)
Dept: PRIMARY CARE CLINIC | Facility: CLINIC | Age: 48
End: 2023-08-07
Payer: MEDICAID

## 2023-08-07 NOTE — TELEPHONE ENCOUNTER
I left a detailed message today for No, to let her know of the info from FLO Davila. I also gave her the process and the telephone number for her to contact our medical records in [place], as she is advised we DO NOT handle any of the medical records in  .       August 2, 2023  Savana Olivera LPN  to Elvia Feldman    8/2/23 11:17 AM  Hey I sent it when you emailed me and called [facility] and gave them medical records number to follow up on the process.   Me     CL    8/2/23 10:12 AM  Note  ----- Message from Savana Olivera LPN sent at 8/1/2023  4:39 PM CDT -----  Contact: No/[facility]  I got it Friday and sent it in, I also called No to let her know it was sent and to call Medical Records at [phone] for a status update     ----- Message -----  From: Savana Olivera LPN  Sent: 8/1/2023   4:32 PM CDT  To: Reva Acosta MA     Did we receive a request for records?     ----- Message -----  From: Reva Acosta MA  Sent: 7/31/2023   2:54 PM CDT  To: Charlotte Musa RN; #                  ----- Message from Brenna Day sent at 8/7/2023 11:52 AM CDT -----  Contact: No/[facility]  No is calling regards to status the medical records request that was fax on 7/17/23, please call her at [phone] and or fax it back to her at [phone].    Thanks  Td

## 2023-08-16 ENCOUNTER — TELEPHONE (OUTPATIENT)
Dept: PRIMARY CARE CLINIC | Facility: CLINIC | Age: 48
End: 2023-08-16
Payer: MEDICAID

## 2023-08-16 NOTE — TELEPHONE ENCOUNTER
I have not gotten a letter. She never said anything about a letter until today. She was calling about medical records all before over and over.     ----- Message from Janna Bermudez MD sent at 8/16/2023 10:10 AM CDT -----  Contact: [place]/[facility]  Sorricardo where is the letter? I didn't get it    ----- Message -----  From: Reva Acosta MA  Sent: 8/16/2023  10:06 AM CDT  To: Charlotte Musa RN; #      ----- Message -----  From: Daisy Junior  Sent: 8/16/2023   9:30 AM CDT  To: Aidan Saldivar Staff    Type: Staff Message    [facility]  Call back number: [phone] (direct #)  Reason for the call: Req to speak with manager - they sent a letter for Dr Bermudez's response on this patient and have called several times, can never get an answer  Additional information: N/a

## 2023-08-17 ENCOUNTER — TELEPHONE (OUTPATIENT)
Dept: PRIMARY CARE CLINIC | Facility: CLINIC | Age: 48
End: 2023-08-17
Payer: MEDICAID

## 2023-08-17 NOTE — TELEPHONE ENCOUNTER
"I LVM to advise [place] to send the "letter" to our fax number as soon as she get's the message so this can be taken care of.       ----- Message from Viola Mccormick sent at 8/17/2023  9:02 AM CDT -----  Contact: [place] levin [facility]      "

## 2023-08-28 DIAGNOSIS — I10 ESSENTIAL HYPERTENSION: ICD-10-CM

## 2023-08-29 RX ORDER — LISINOPRIL 10 MG/1
10 TABLET ORAL
Qty: 90 TABLET | Refills: 0 | Status: SHIPPED | OUTPATIENT
Start: 2023-08-29

## 2024-06-20 ENCOUNTER — TELEPHONE (OUTPATIENT)
Dept: TRANSPLANT | Facility: CLINIC | Age: 49
End: 2024-06-20
Payer: MEDICAID

## 2024-06-20 NOTE — TELEPHONE ENCOUNTER
----- Message from Jesus Staton sent at 6/20/2024 12:25 PM CDT -----    Hepatology referral received and scanned into media; pt chart sent to referral nurse for medical review.       Referring Provider: Courtney Gage NP  Phone: [phone]  Fax: [phone]  .

## 2024-06-25 ENCOUNTER — TELEPHONE (OUTPATIENT)
Dept: TRANSPLANT | Facility: CLINIC | Age: 49
End: 2024-06-25
Payer: MEDICAID

## 2024-06-26 NOTE — TELEPHONE ENCOUNTER
Pt records reviewed.  Pt will be referred to Hepatology due to second opinion on liver hemangiomas  Initial referral received  from Referring Provider: Courtney Gage NP   Phone: [phone]   Fax: [phone]   Referral letter sent to patient.     request for CD from [facility]        US Abdomen 6/7/2024 and  CT abdomen 6/7/2024  CT abdomen 10/7/2019        RECORDS SCANNED IN MEDIA UNDER HEPATOLOGY REFERRAL .

## 2024-06-27 ENCOUNTER — TELEPHONE (OUTPATIENT)
Dept: TRANSPLANT | Facility: CLINIC | Age: 49
End: 2024-06-27
Payer: MEDICAID

## 2024-06-27 NOTE — TELEPHONE ENCOUNTER
Called the patient to schedule a hepatology consult from referral.  No answer, left voicemail with call back # [phone].

## 2024-06-27 NOTE — TELEPHONE ENCOUNTER
----- Message from Jesus Staton sent at 6/27/2024  8:25 AM CDT -----  Regarding: FW: please request CD's tks.    Request submitted via [employer]  .  ----- Message -----  From: Yasmine Santiago  Sent: 6/25/2024   8:30 PM CDT  To: Jesus Staton  Subject: please request CD's tks.                         Need to request CD from [facility]    [phone]     US Abdomen 6/7/2024 and  CT abdomen 6/7/2024  CT abdomen 10/7/2019    TKS  sending to hepatology

## 2024-07-05 ENCOUNTER — PATIENT MESSAGE (OUTPATIENT)
Dept: HEPATOLOGY | Facility: CLINIC | Age: 49
End: 2024-07-05
Payer: MEDICAID

## 2024-08-24 ENCOUNTER — OFFICE VISIT (OUTPATIENT)
Dept: URGENT CARE | Facility: CLINIC | Age: 49
End: 2024-08-24
Payer: MEDICAID

## 2024-08-24 VITALS
OXYGEN SATURATION: 97 % | WEIGHT: 295 LBS | RESPIRATION RATE: 16 BRPM | HEART RATE: 65 BPM | DIASTOLIC BLOOD PRESSURE: 123 MMHG | SYSTOLIC BLOOD PRESSURE: 183 MMHG | BODY MASS INDEX: 37.86 KG/M2 | HEIGHT: 74 IN | TEMPERATURE: 98 F

## 2024-08-24 DIAGNOSIS — R05.9 COUGH, UNSPECIFIED TYPE: Primary | ICD-10-CM

## 2024-08-24 DIAGNOSIS — J01.90 ACUTE NON-RECURRENT SINUSITIS, UNSPECIFIED LOCATION: ICD-10-CM

## 2024-08-24 DIAGNOSIS — H65.192 ACUTE EFFUSION OF LEFT EAR: ICD-10-CM

## 2024-08-24 LAB
CTP QC/QA: YES
SARS-COV-2 AG RESP QL IA.RAPID: NEGATIVE

## 2024-08-24 PROCEDURE — 87811 SARS-COV-2 COVID19 W/OPTIC: CPT | Mod: QW,S$GLB,,

## 2024-08-24 PROCEDURE — 99204 OFFICE O/P NEW MOD 45 MIN: CPT | Mod: S$GLB,,,

## 2024-08-24 RX ORDER — AMOXICILLIN 500 MG/1
500 TABLET, FILM COATED ORAL EVERY 12 HOURS
Qty: 14 TABLET | Refills: 0 | Status: SHIPPED | OUTPATIENT
Start: 2024-08-24 | End: 2024-08-31

## 2024-08-24 RX ORDER — PROMETHAZINE HYDROCHLORIDE AND DEXTROMETHORPHAN HYDROBROMIDE 6.25; 15 MG/5ML; MG/5ML
5 SYRUP ORAL EVERY 4 HOURS PRN
Qty: 300 ML | Refills: 0 | Status: SHIPPED | OUTPATIENT
Start: 2024-08-24 | End: 2024-09-03

## 2024-08-24 NOTE — PATIENT INSTRUCTIONS
You have tested negative for COVID on our Binax test. As a follow up the recommendation is if you have symptoms within the first 7 days to retest within 48 hours. I you have NO SYMPTONS then you should test every 48 hours two more times.     Please follow up with your primary care provider within 2-5 days if your signs and symptoms have not resolved or worsen.         If your condition worsens or fails to improve we recommend that you receive another evaluation at the emergency room immediately or contact your primary medical clinic to discuss your concerns.   You must understand that you have received an Urgent Care treatment only and that you may be released before all of your medical problems are known or treated. You, the patient, will arrange for follow up care as instructed.

## 2024-08-24 NOTE — PROGRESS NOTES
Subjective:      Patient ID: Tray Perez is a 49 y.o. male.    Vitals:  vitals were not taken for this visit.     Chief Complaint: Cough, Fever, and Chest Pain    Pt states he had covid on July 10th, the symptoms started retreating and he tested positive again a month later, the ER told him he has long covid.     Cough  Associated symptoms include chest pain, ear pain, a fever and a sore throat.   Fever   Associated symptoms include chest pain, coughing, ear pain and a sore throat.   Chest Pain   Associated symptoms include a cough, diaphoresis and a fever.       Constitution: Positive for sweating and fever.   HENT:  Positive for ear pain and sore throat. Negative for ear discharge and hearing loss.    Cardiovascular:  Positive for chest pain.   Respiratory:  Positive for chest tightness and cough.       Objective:     Physical Exam   Constitutional: He is oriented to person, place, and time. normal  HENT:   Head: Normocephalic and atraumatic.   Ears:   Right Ear: Tympanic membrane and external ear normal.   Left Ear: Tympanic membrane and external ear normal.   Nose: Congestion present.   Mouth/Throat: Mucous membranes are moist. Posterior oropharyngeal erythema present. No oropharyngeal exudate.   Eyes: Conjunctivae are normal. Pupils are equal, round, and reactive to light. Extraocular movement intact   Cardiovascular: Normal rate, regular rhythm, normal heart sounds and normal pulses.   Pulmonary/Chest: Effort normal and breath sounds normal.   Abdominal: Normal appearance and bowel sounds are normal. Soft.   Musculoskeletal: Normal range of motion.         General: Normal range of motion.   Neurological: He is alert and oriented to person, place, and time.   Skin: Skin is warm and dry. Capillary refill takes less than 2 seconds.   Psychiatric: His behavior is normal. Mood normal.     Assessment:     1. Cough, unspecified type    - SARS Coronavirus 2 Antigen, POCT Manual Read  -  promethazine-dextromethorphan (PROMETHAZINE-DM) 6.25-15 mg/5 mL Syrp; Take 5 mLs by mouth every 4 (four) hours as needed.  Dispense: 300 mL; Refill: 0    2. Acute effusion of left ear    - amoxicillin (AMOXIL) 500 MG Tab; Take 1 tablet (500 mg total) by mouth every 12 (twelve) hours. for 7 days  Dispense: 14 tablet; Refill: 0    3. Acute non-recurrent sinusitis, unspecified location  Office Visit on 08/24/2024   Component Date Value Ref Range Status    SARS Coronavirus 2 Antigen 08/24/2024 Negative  Negative Final     Acceptable 08/24/2024 Yes   Final         Plan:     Patient Instructions   You have tested negative for COVID on our Binax test. As a follow up the recommendation is if you have symptoms within the first 7 days to retest within 48 hours. I you have NO SYMPTONS then you should test every 48 hours two more times.     Please follow up with your primary care provider within 2-5 days if your signs and symptoms have not resolved or worsen.         If your condition worsens or fails to improve we recommend that you receive another evaluation at the emergency room immediately or contact your primary medical clinic to discuss your concerns.   You must understand that you have received an Urgent Care treatment only and that you may be released before all of your medical problems are known or treated. You, the patient, will arrange for follow up care as instructed.       There are no diagnoses linked to this encounter.      Medical Decision Making:   Urgent Care Management:  Pt presents to  with c/o having covid July 10th and the beginning of August.  Was dx with long covid.  Given steroids.  Felt better.  Woke up yesterday not feeling good.  C/o sore throat, cough, chest tightness, congestion, fever and ear pain.  Pt tested negative for covid in clinic.  Left ear has serous effusion.

## 2024-10-09 ENCOUNTER — DOCUMENTATION ONLY (OUTPATIENT)
Dept: SURGICAL ONCOLOGY | Facility: CLINIC | Age: 49
End: 2024-10-09
Payer: MEDICAID

## 2024-10-09 NOTE — PROGRESS NOTES
Referral received on this patient for a new liver mass found on CT and follow up as post liver surgery.    Patient is a past patient of Dr. Hector. I reached out and left a message for the referring office regarding records needed and recent notes from their office. Fax and phone number left to return call/send the records. cpl

## 2024-10-11 DIAGNOSIS — R16.0 LIVER MASS: Primary | ICD-10-CM

## 2024-11-12 ENCOUNTER — OFFICE VISIT (OUTPATIENT)
Dept: SURGICAL ONCOLOGY | Facility: CLINIC | Age: 49
End: 2024-11-12
Payer: MEDICAID

## 2024-11-12 VITALS
OXYGEN SATURATION: 97 % | DIASTOLIC BLOOD PRESSURE: 105 MMHG | BODY MASS INDEX: 37.73 KG/M2 | WEIGHT: 294 LBS | TEMPERATURE: 98 F | HEIGHT: 74 IN | SYSTOLIC BLOOD PRESSURE: 167 MMHG | HEART RATE: 66 BPM

## 2024-11-12 DIAGNOSIS — Z98.890 HISTORY OF ABLATION OF NEOPLASM OF LIVER: ICD-10-CM

## 2024-11-12 DIAGNOSIS — R16.0 LIVER MASS: ICD-10-CM

## 2024-11-12 DIAGNOSIS — Z90.49 HX OF RESECTION OF LIVER: ICD-10-CM

## 2024-11-12 DIAGNOSIS — D49.0 LIVER NEOPLASM: ICD-10-CM

## 2024-11-12 DIAGNOSIS — R16.0 LIVER MASS: Primary | ICD-10-CM

## 2024-11-12 PROCEDURE — 1159F MED LIST DOCD IN RCRD: CPT | Mod: CPTII,,, | Performed by: SURGERY

## 2024-11-12 PROCEDURE — G2211 COMPLEX E/M VISIT ADD ON: HCPCS | Mod: S$PBB,,, | Performed by: SURGERY

## 2024-11-12 PROCEDURE — 3008F BODY MASS INDEX DOCD: CPT | Mod: CPTII,,, | Performed by: SURGERY

## 2024-11-12 PROCEDURE — 4010F ACE/ARB THERAPY RXD/TAKEN: CPT | Mod: CPTII,,, | Performed by: SURGERY

## 2024-11-12 PROCEDURE — 3080F DIAST BP >= 90 MM HG: CPT | Mod: CPTII,,, | Performed by: SURGERY

## 2024-11-12 PROCEDURE — 99214 OFFICE O/P EST MOD 30 MIN: CPT | Mod: PBBFAC | Performed by: SURGERY

## 2024-11-12 PROCEDURE — 99999 PR PBB SHADOW E&M-EST. PATIENT-LVL IV: CPT | Mod: PBBFAC,,, | Performed by: SURGERY

## 2024-11-12 PROCEDURE — 3077F SYST BP >= 140 MM HG: CPT | Mod: CPTII,,, | Performed by: SURGERY

## 2024-11-12 PROCEDURE — 99214 OFFICE O/P EST MOD 30 MIN: CPT | Mod: S$PBB,,, | Performed by: SURGERY

## 2024-11-12 RX ORDER — ALBUTEROL SULFATE 0.63 MG/3ML
0.63 SOLUTION RESPIRATORY (INHALATION) EVERY 6 HOURS PRN
COMMUNITY

## 2024-11-12 RX ORDER — AMITRIPTYLINE HYDROCHLORIDE 10 MG/1
10 TABLET, FILM COATED ORAL NIGHTLY
COMMUNITY

## 2024-11-12 NOTE — PROGRESS NOTES
History & Physical    CHIEF COMPLAINT:  LIVER MASS     History of Present Illness:  49 year-old-male referred by Dr. Royal.  Patient has had complaints of passing blood in stools and abdominal cramping x several years.  MRI of the abdomen was done in August 2020, I personally reviewed and interpreted the images, which showed a 4.7 cm mass in the left lobe of the liver.  He did not pursue follow up of the mass due to family matters and COVID.  He underwent liver biopsy in Sept 2022.  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  Follow up MRI abdomen done in Nov showed  a slightly smaller left lobe liver lesion.  CT chest negative.    In January 2023 the patient underwent laparoscopic-assisted ultrasound-guided microwave ablation of left lobe liver lesion.    He presents today without complaint.  Recent surveillance imaging in August was reviewed and read as possibly with areas of enhancement    32 minutes was required for complete chart review, imaging review including interpretation of imaging, patient counseling regarding diagnosis/treatment plan, answering questions, medical decision making, and documentation.    Visit today included increased complexity associated with the care of the episodic problem hepatic small vessel neoplasm addressed and managing the longitudinal care of the patient due to the serious and/or complex managed problem(s) .    Review of patient's allergies indicates:   Allergen Reactions    Latex, natural rubber        Current Outpatient Medications   Medication Sig Dispense Refill    acetaminophen (TYLENOL) 500 MG tablet Take 1,000 mg by mouth every 6 (six) hours as needed for Pain.      albuterol (ACCUNEB) 0.63 mg/3 mL Nebu Inhale 0.63 mg into the lungs every 6 (six) hours as needed.      amitriptyline (ELAVIL) 10 MG tablet Take 10 mg by mouth every evening.      cetirizine (ZYRTEC) 10 MG tablet Take 10 mg by mouth daily as needed for Allergies.      HYDROcodone-acetaminophen  (NORCO) 5-325 mg per tablet Take 1 tablet by mouth every 4 (four) hours as needed.      HYDROcodone-acetaminophen (NORCO) 7.5-325 mg per tablet Take 1 tablet by mouth every 6 (six) hours as needed for Pain. 15 tablet 0    lisinopriL 10 MG tablet TAKE ONE TABLET BY MOUTH ONCE DAILY 90 tablet 0    ondansetron (ZOFRAN) 8 MG tablet Take 8 mg by mouth every 8 (eight) hours as needed for Nausea.      citalopram (CELEXA) 10 MG tablet Take 1 tablet (10 mg total) by mouth once daily. 30 tablet 11     No current facility-administered medications for this visit.       Past Medical History:   Diagnosis Date    Anxiety     Arthritis     Ndiaye's esophagus     Bloody stool     Colitis     Depression     Diarrhea     Epigastric pain     GERD (gastroesophageal reflux disease)     Hypertension     IBS (irritable bowel syndrome)     Kidney stone     Liver cancer     Migraine     Nausea & vomiting     Obesity     Urinary tract infection      Past Surgical History:   Procedure Laterality Date    BIOPSY OF LIVER WITH ULTRASOUND GUIDANCE N/A 1/23/2023    Procedure: BIOPSY, LIVER, WITH US GUIDANCE;  Surgeon: Shelton Hector MD;  Location: [facility];  Service: General;  Laterality: N/A;    CARPAL TUNNEL RELEASE Left     COLONOSCOPY      multiple    ESOPHAGOGASTRODUODENOSCOPY      FRACTURE SURGERY Left     LAPAROSCOPIC ABLATION OF LIVER TUMOR USING RADIOFREQUENCY N/A 1/23/2023    Procedure: RADIOFREQUENCY ABLATION, NEOPLASM, LIVER, LAPAROSCOPIC;  Surgeon: Shelton Hector MD;  Location: [facility];  Service: General;  Laterality: N/A;    Seabceous Cyst Removed from Forehead      ULNAR TUNNEL RELEASE Left      Family History   Problem Relation Name Age of Onset    Heart disease Father      Hypertension Father      No Known Problems Mother       Social History     Tobacco Use    Smoking status: Every Day     Current packs/day: 0.50     Average packs/day: 0.5 packs/day for 4.2 years (2.1 ttl pk-yrs)     Types: Cigarettes     Start date: 8/27/2020  "   Smokeless tobacco: Never   Substance Use Topics    Alcohol use: Not Currently    Drug use: Yes     Types: Marijuana        Review of Systems:  Review of Systems   Constitutional:  Negative for activity change, appetite change, chills, diaphoresis, fatigue and fever.   HENT:  Negative for congestion, ear pain, tinnitus and trouble swallowing.    Eyes:  Negative for photophobia and pain.   Respiratory:  Negative for apnea, cough, choking, chest tightness, shortness of breath and stridor.    Cardiovascular:  Negative for chest pain, palpitations and leg swelling.   Endocrine: Negative for cold intolerance and heat intolerance.   Genitourinary:  Negative for difficulty urinating, dysuria, enuresis, flank pain, frequency and hematuria.   Musculoskeletal:  Negative for arthralgias, back pain and gait problem.   Neurological:  Negative for dizziness, seizures, syncope, facial asymmetry, speech difficulty, weakness, light-headedness, numbness and headaches.   Psychiatric/Behavioral:  Negative for agitation, behavioral problems, confusion and decreased concentration.    All other systems reviewed and are negative.      OBJECTIVE:     Vital Signs (Most Recent)  Temp: 98.4 °F (36.9 °C) (11/12/24 1348)  Pulse: 66 (11/12/24 1348)  BP: (!) 167/105 (11/12/24 1355)  SpO2: 97 % (11/12/24 1348)  6' 2" (1.88 m)  133.4 kg (294 lb)     Physical Exam:  Physical Exam  Constitutional:       General: He is not in acute distress.     Appearance: Normal appearance. He is well-developed and normal weight. He is not ill-appearing, toxic-appearing or diaphoretic.   HENT:      Head: Normocephalic and atraumatic.      Right Ear: Hearing and external ear normal.      Left Ear: Hearing and external ear normal.      Nose: No congestion or rhinorrhea.      Mouth/Throat:      Mouth: Mucous membranes are moist.      Pharynx: Oropharynx is clear. No oropharyngeal exudate.   Eyes:      General: Lids are normal. Gaze aligned appropriately. No scleral " icterus.     Extraocular Movements: Extraocular movements intact.      Right eye: Normal extraocular motion and no nystagmus.      Left eye: Normal extraocular motion and no nystagmus.      Conjunctiva/sclera: Conjunctivae normal.      Right eye: Right conjunctiva is not injected.      Left eye: Left conjunctiva is not injected.      Pupils: Pupils are equal, round, and reactive to light.   Neck:      Vascular: No carotid bruit or JVD.      Trachea: Trachea and phonation normal.   Cardiovascular:      Rate and Rhythm: Normal rate and regular rhythm.      Pulses: Normal pulses.      Heart sounds: Normal heart sounds. No murmur heard.     No friction rub. No gallop.   Pulmonary:      Effort: Pulmonary effort is normal. No tachypnea, accessory muscle usage, respiratory distress or retractions.      Breath sounds: Normal breath sounds and air entry. No stridor or decreased air movement. No wheezing or rhonchi.   Chest:      Chest wall: No mass, deformity, swelling, tenderness or crepitus.   Abdominal:      General: Abdomen is flat. Bowel sounds are normal. There is no distension. There are no signs of injury.      Palpations: Abdomen is soft. There is no shifting dullness, fluid wave, hepatomegaly, splenomegaly or mass.      Tenderness: There is no abdominal tenderness. There is no guarding or rebound.      Hernia: No hernia is present.   Musculoskeletal:         General: No swelling or tenderness.      Cervical back: Normal range of motion and neck supple. No rigidity, tenderness or crepitus.   Lymphadenopathy:      Head:      Right side of head: No submental, submandibular or occipital adenopathy.      Left side of head: No submental, submandibular or occipital adenopathy.      Cervical: No cervical adenopathy.      Right cervical: No superficial or posterior cervical adenopathy.     Left cervical: No superficial or posterior cervical adenopathy.      Upper Body:      Right upper body: No supraclavicular or axillary  adenopathy.      Left upper body: No supraclavicular or axillary adenopathy.      Lower Body: No right inguinal adenopathy. No left inguinal adenopathy.   Skin:     General: Skin is warm.      Capillary Refill: Capillary refill takes less than 2 seconds.      Coloration: Skin is not cyanotic, jaundiced, mottled or pale.      Findings: No bruising, ecchymosis, erythema, lesion, petechiae or rash.      Nails: There is no clubbing.   Neurological:      General: No focal deficit present.      Mental Status: He is alert and oriented to person, place, and time.      Cranial Nerves: No cranial nerve deficit or facial asymmetry.      Sensory: No sensory deficit.      Motor: No weakness, tremor, atrophy or abnormal muscle tone.      Coordination: Coordination normal.      Gait: Gait normal.      Deep Tendon Reflexes: Reflexes normal.   Psychiatric:         Attention and Perception: Attention and perception normal.         Mood and Affect: Mood normal. Mood is not anxious or depressed. Affect is not blunt or flat.         Speech: Speech normal. Speech is not slurred.         Behavior: Behavior normal. Behavior is cooperative.           ASSESSMENT/PLAN:     Hepatic small-vessel neoplasm    Equivocal areas of enhancement on recent CT scan    Obtain dedicated MRI with liver protocol to further assess at [facility] and follow up same day        Shelton Hector MD  Surgical Oncology  Complex General, Gastrointestinal and Hepatobiliary Surgery

## 2024-11-13 ENCOUNTER — TELEPHONE (OUTPATIENT)
Dept: SURGICAL ONCOLOGY | Facility: CLINIC | Age: 49
End: 2024-11-13
Payer: MEDICAID

## 2024-12-04 ENCOUNTER — OFFICE VISIT (OUTPATIENT)
Dept: SURGICAL ONCOLOGY | Facility: CLINIC | Age: 49
End: 2024-12-04
Payer: MEDICAID

## 2024-12-04 VITALS
SYSTOLIC BLOOD PRESSURE: 160 MMHG | HEIGHT: 74 IN | BODY MASS INDEX: 39.14 KG/M2 | HEART RATE: 83 BPM | OXYGEN SATURATION: 98 % | DIASTOLIC BLOOD PRESSURE: 97 MMHG | WEIGHT: 305 LBS

## 2024-12-04 DIAGNOSIS — Z90.49 HX OF RESECTION OF LIVER: ICD-10-CM

## 2024-12-04 DIAGNOSIS — R16.0 LIVER MASS: Primary | ICD-10-CM

## 2024-12-04 DIAGNOSIS — Z90.49 HX OF RESECTION OF LIVER: Primary | ICD-10-CM

## 2024-12-04 DIAGNOSIS — K62.5 RECTAL BLEEDING: Primary | ICD-10-CM

## 2024-12-04 PROCEDURE — 3080F DIAST BP >= 90 MM HG: CPT | Mod: CPTII,,, | Performed by: SURGERY

## 2024-12-04 PROCEDURE — 99213 OFFICE O/P EST LOW 20 MIN: CPT | Mod: PBBFAC | Performed by: SURGERY

## 2024-12-04 PROCEDURE — 3008F BODY MASS INDEX DOCD: CPT | Mod: CPTII,,, | Performed by: SURGERY

## 2024-12-04 PROCEDURE — 99999 PR PBB SHADOW E&M-EST. PATIENT-LVL III: CPT | Mod: PBBFAC,,, | Performed by: SURGERY

## 2024-12-04 PROCEDURE — 4010F ACE/ARB THERAPY RXD/TAKEN: CPT | Mod: CPTII,,, | Performed by: SURGERY

## 2024-12-04 PROCEDURE — 3077F SYST BP >= 140 MM HG: CPT | Mod: CPTII,,, | Performed by: SURGERY

## 2024-12-04 PROCEDURE — 1159F MED LIST DOCD IN RCRD: CPT | Mod: CPTII,,, | Performed by: SURGERY

## 2024-12-04 PROCEDURE — 99214 OFFICE O/P EST MOD 30 MIN: CPT | Mod: S$PBB,,, | Performed by: SURGERY

## 2024-12-04 NOTE — PROGRESS NOTES
History & Physical    CHIEF COMPLAINT:  LIVER MASS     History of Present Illness:  49 year-old-male referred by Dr. Royal.  Patient has had complaints of passing blood in stools and abdominal cramping x several years.  MRI of the abdomen was done in August 2020, I personally reviewed and interpreted the images, which showed a 4.7 cm mass in the left lobe of the liver.  He did not pursue follow up of the mass due to family matters and COVID.  He underwent liver biopsy in Sept 2022.  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  Follow up MRI abdomen done in Nov showed  a slightly smaller left lobe liver lesion.  CT chest negative.    In January 2023 the patient underwent laparoscopic-assisted ultrasound-guided microwave ablation of left lobe liver lesion.    He presents today without complaint.  Recent surveillance imaging in August was reviewed and read as possibly with areas of enhancement    32 minutes was required for complete chart review, imaging review including interpretation of imaging, patient counseling regarding diagnosis/treatment plan, answering questions, medical decision making, and documentation.    Visit today included increased complexity associated with the care of the episodic problem hepatic small vessel neoplasm addressed and managing the longitudinal care of the patient due to the serious and/or complex managed problem(s) .    Review of patient's allergies indicates:   Allergen Reactions    Latex, natural rubber        Current Outpatient Medications   Medication Sig Dispense Refill    acetaminophen (TYLENOL) 500 MG tablet Take 1,000 mg by mouth every 6 (six) hours as needed for Pain.      albuterol (ACCUNEB) 0.63 mg/3 mL Nebu Inhale 0.63 mg into the lungs every 6 (six) hours as needed.      amitriptyline (ELAVIL) 10 MG tablet Take 10 mg by mouth every evening.      cetirizine (ZYRTEC) 10 MG tablet Take 10 mg by mouth daily as needed for Allergies.      HYDROcodone-acetaminophen  (NORCO) 5-325 mg per tablet Take 1 tablet by mouth every 4 (four) hours as needed.      HYDROcodone-acetaminophen (NORCO) 7.5-325 mg per tablet Take 1 tablet by mouth every 6 (six) hours as needed for Pain. 15 tablet 0    lisinopriL 10 MG tablet TAKE ONE TABLET BY MOUTH ONCE DAILY 90 tablet 0    ondansetron (ZOFRAN) 8 MG tablet Take 8 mg by mouth every 8 (eight) hours as needed for Nausea.      citalopram (CELEXA) 10 MG tablet Take 1 tablet (10 mg total) by mouth once daily. 30 tablet 11     No current facility-administered medications for this visit.       Past Medical History:   Diagnosis Date    Anxiety     Arthritis     Ndiaye's esophagus     Bloody stool     Colitis     Depression     Diarrhea     Epigastric pain     GERD (gastroesophageal reflux disease)     Hypertension     IBS (irritable bowel syndrome)     Kidney stone     Liver cancer     Migraine     Nausea & vomiting     Obesity     Urinary tract infection      Past Surgical History:   Procedure Laterality Date    BIOPSY OF LIVER WITH ULTRASOUND GUIDANCE N/A 1/23/2023    Procedure: BIOPSY, LIVER, WITH US GUIDANCE;  Surgeon: Shelton Hector MD;  Location: [facility];  Service: General;  Laterality: N/A;    CARPAL TUNNEL RELEASE Left     COLONOSCOPY      multiple    ESOPHAGOGASTRODUODENOSCOPY      FRACTURE SURGERY Left     LAPAROSCOPIC ABLATION OF LIVER TUMOR USING RADIOFREQUENCY N/A 1/23/2023    Procedure: RADIOFREQUENCY ABLATION, NEOPLASM, LIVER, LAPAROSCOPIC;  Surgeon: Shelton Hector MD;  Location: [facility];  Service: General;  Laterality: N/A;    Seabceous Cyst Removed from Forehead      ULNAR TUNNEL RELEASE Left      Family History   Problem Relation Name Age of Onset    Heart disease Father      Hypertension Father      No Known Problems Mother       Social History     Tobacco Use    Smoking status: Former     Current packs/day: 0.50     Average packs/day: 0.5 packs/day for 4.3 years (2.1 ttl pk-yrs)     Types: Cigarettes     Start date: 8/27/2020     "Smokeless tobacco: Never    Tobacco comments:     Stopped 10 years ago   Substance Use Topics    Alcohol use: Not Currently    Drug use: Yes     Types: Marijuana        Review of Systems:  Review of Systems   Constitutional:  Negative for activity change, appetite change, chills, diaphoresis, fatigue and fever.   HENT:  Negative for congestion, ear pain, tinnitus and trouble swallowing.    Eyes:  Negative for photophobia and pain.   Respiratory:  Negative for apnea, cough, choking, chest tightness, shortness of breath and stridor.    Cardiovascular:  Negative for chest pain, palpitations and leg swelling.   Endocrine: Negative for cold intolerance and heat intolerance.   Genitourinary:  Negative for difficulty urinating, dysuria, enuresis, flank pain, frequency and hematuria.   Musculoskeletal:  Negative for arthralgias, back pain and gait problem.   Neurological:  Negative for dizziness, seizures, syncope, facial asymmetry, speech difficulty, weakness, light-headedness, numbness and headaches.   Psychiatric/Behavioral:  Negative for agitation, behavioral problems, confusion and decreased concentration.    All other systems reviewed and are negative.      OBJECTIVE:     Vital Signs (Most Recent)  Pulse: 83 (12/04/24 1306)  BP: (!) 160/97 (did not take BP med this am) (12/04/24 1306)  SpO2: 98 % (12/04/24 1306)  6' 2" (1.88 m)  (!) 138.3 kg (305 lb)     Physical Exam:  Physical Exam  Constitutional:       General: He is not in acute distress.     Appearance: Normal appearance. He is well-developed and normal weight. He is not ill-appearing, toxic-appearing or diaphoretic.   HENT:      Head: Normocephalic and atraumatic.      Right Ear: Hearing and external ear normal.      Left Ear: Hearing and external ear normal.      Nose: No congestion or rhinorrhea.      Mouth/Throat:      Mouth: Mucous membranes are moist.      Pharynx: Oropharynx is clear. No oropharyngeal exudate.   Eyes:      General: Lids are normal. Gaze " aligned appropriately. No scleral icterus.     Extraocular Movements: Extraocular movements intact.      Right eye: Normal extraocular motion and no nystagmus.      Left eye: Normal extraocular motion and no nystagmus.      Conjunctiva/sclera: Conjunctivae normal.      Right eye: Right conjunctiva is not injected.      Left eye: Left conjunctiva is not injected.      Pupils: Pupils are equal, round, and reactive to light.   Neck:      Vascular: No carotid bruit or JVD.      Trachea: Trachea and phonation normal.   Cardiovascular:      Rate and Rhythm: Normal rate and regular rhythm.      Pulses: Normal pulses.      Heart sounds: Normal heart sounds. No murmur heard.     No friction rub. No gallop.   Pulmonary:      Effort: Pulmonary effort is normal. No tachypnea, accessory muscle usage, respiratory distress or retractions.      Breath sounds: Normal breath sounds and air entry. No stridor or decreased air movement. No wheezing or rhonchi.   Chest:      Chest wall: No mass, deformity, swelling, tenderness or crepitus.   Abdominal:      General: Abdomen is flat. Bowel sounds are normal. There is no distension. There are no signs of injury.      Palpations: Abdomen is soft. There is no shifting dullness, fluid wave, hepatomegaly, splenomegaly or mass.      Tenderness: There is no abdominal tenderness. There is no guarding or rebound.      Hernia: No hernia is present.   Musculoskeletal:         General: No swelling or tenderness.      Cervical back: Normal range of motion and neck supple. No rigidity, tenderness or crepitus.   Lymphadenopathy:      Head:      Right side of head: No submental, submandibular or occipital adenopathy.      Left side of head: No submental, submandibular or occipital adenopathy.      Cervical: No cervical adenopathy.      Right cervical: No superficial or posterior cervical adenopathy.     Left cervical: No superficial or posterior cervical adenopathy.      Upper Body:      Right upper  body: No supraclavicular or axillary adenopathy.      Left upper body: No supraclavicular or axillary adenopathy.      Lower Body: No right inguinal adenopathy. No left inguinal adenopathy.   Skin:     General: Skin is warm.      Capillary Refill: Capillary refill takes less than 2 seconds.      Coloration: Skin is not cyanotic, jaundiced, mottled or pale.      Findings: No bruising, ecchymosis, erythema, lesion, petechiae or rash.      Nails: There is no clubbing.   Neurological:      General: No focal deficit present.      Mental Status: He is alert and oriented to person, place, and time.      Cranial Nerves: No cranial nerve deficit or facial asymmetry.      Sensory: No sensory deficit.      Motor: No weakness, tremor, atrophy or abnormal muscle tone.      Coordination: Coordination normal.      Gait: Gait normal.      Deep Tendon Reflexes: Reflexes normal.   Psychiatric:         Attention and Perception: Attention and perception normal.         Mood and Affect: Mood normal. Mood is not anxious or depressed. Affect is not blunt or flat.         Speech: Speech normal. Speech is not slurred.         Behavior: Behavior normal. Behavior is cooperative.           ASSESSMENT/PLAN:     Hepatic small-vessel neoplasm, status post microwave ablation    Repeat MRI shows hepatic hemangiomas, no evidence of recurrent tumor, vertebral body hemangiomas as well.    Patient complains of rectal bleeding, refer to gastroenterology    Return to clinic in 3 months with repeat MRI of the abdomen with liver protocol at [facility]      Shelton Hector MD  Surgical Oncology  Complex General, Gastrointestinal and Hepatobiliary Surgery

## 2024-12-18 ENCOUNTER — DOCUMENTATION ONLY (OUTPATIENT)
Dept: SURGICAL ONCOLOGY | Facility: CLINIC | Age: 49
End: 2024-12-18
Payer: MEDICAID

## 2024-12-18 NOTE — PROGRESS NOTES
Called Dr. Mcginnis's office to check on re-referral back to them. No answer.. Let voicemail with his nurse,Stephenie. TC

## 2025-03-24 ENCOUNTER — PATIENT MESSAGE (OUTPATIENT)
Dept: SURGICAL ONCOLOGY | Facility: CLINIC | Age: 50
End: 2025-03-24
Payer: MEDICAID

## 2025-07-16 ENCOUNTER — DOCUMENTATION ONLY (OUTPATIENT)
Dept: SURGICAL ONCOLOGY | Facility: CLINIC | Age: 50
End: 2025-07-16
Payer: MEDICAID

## 2025-07-16 NOTE — PROGRESS NOTES
Patient called in stating that he was unable to complete his MRI due to his anxiety and that he had a panic attack on his way there. He did state that he wanted to reschedule the MRI but that he would like some medication prior to the next appt.     Bing Perez RN left  for patient to confirm preferred pharmacy. I also sent Bonita Monroy at [facility] a msg to reschedule patient's MRI. Once we have date and are able to confirm pharm with patient, we will proceed. cpl

## 2025-07-31 ENCOUNTER — DOCUMENTATION ONLY (OUTPATIENT)
Dept: SURGICAL ONCOLOGY | Facility: CLINIC | Age: 50
End: 2025-07-31
Payer: MEDICAID

## 2025-07-31 NOTE — PROGRESS NOTES
Patient requested medication for anxiety during MRI.  Called in Valium 5mg #1 take 1 hour prior to MRI 0 refills to [facility].

## (undated) DEVICE — TRAY CATH FOL SIL URIMTR 16FR

## (undated) DEVICE — NDL 20GX1-1/2IN IB

## (undated) DEVICE — CARTRIDGE BABCOCK GRASPER 5X45

## (undated) DEVICE — OBTURATOR BLADELESS 8MM XI CLR

## (undated) DEVICE — LEGGING SURG CONV 43X28IN

## (undated) DEVICE — GLOVE PROTEXIS HYDROGEL SZ7

## (undated) DEVICE — ELECTRODE REM PLYHSV RETURN 9

## (undated) DEVICE — SUT VICRYL+ 27 UR-6 VIOL

## (undated) DEVICE — ADHESIVE DERMABOND ADVANCED

## (undated) DEVICE — NDL MAXCORE BIOPSY 18G 25CM

## (undated) DEVICE — SOL NACL IRR 1000ML BTL

## (undated) DEVICE — SYR ONLY LUER LOCK 20CC

## (undated) DEVICE — SUT MCRYL PLUS 4-0 PS2 27IN

## (undated) DEVICE — SUT PROLENE 5-0 36IN C-1

## (undated) DEVICE — CONTAINER SPECIMEN SCREW 4OZ

## (undated) DEVICE — COVER PROBE US 5.5X58L NON LTX

## (undated) DEVICE — CANNULA REDUCER 12-8MM

## (undated) DEVICE — APPLICATOR CHLORAPREP ORN 26ML

## (undated) DEVICE — SET TRI-LUMEN FILTERED TUBE

## (undated) DEVICE — TROCAR KII FIOS ZTHREAD 11X100

## (undated) DEVICE — KIT SURGICAL TURNOVER

## (undated) DEVICE — GLOVE PROTEXIS BLUE LATEX 7

## (undated) DEVICE — KIT GEN LAPAROSCOPY LAFAYETTE

## (undated) DEVICE — DRAPE STERI INSTRUMENT 1018

## (undated) DEVICE — SOL CLEARIFY VISUALIZATION LAP

## (undated) DEVICE — IRRIGATOR SUCTION W/TIP

## (undated) DEVICE — COVER TIP CURVED SCISSORS XI

## (undated) DEVICE — ELECTRODE PATIENT RETURN DISP

## (undated) DEVICE — PROBE CERTUS 25CM 13GA

## (undated) DEVICE — DRAPE COLUMN DAVINCI XI

## (undated) DEVICE — PACK DRAPE UNIVERSAL CONVERTOR

## (undated) DEVICE — GLOVE PROTEXIS HYDROGEL SZ6.5

## (undated) DEVICE — CANNULA SEAL 12MM

## (undated) DEVICE — SEAL UNIVERSAL 5MM-8MM XI

## (undated) DEVICE — DRAPE ARM DAVINCI XI

## (undated) DEVICE — SPONGE LAP 4X18 PREWASHED

## (undated) DEVICE — COVER MAYO STAND REINFRCD 30

## (undated) DEVICE — PORT ACCESS 8MM W/120MM LOW

## (undated) DEVICE — BANDAGE CURITY SHEER ADH 1X3IN

## (undated) DEVICE — SOL IRRI STRL WATER 1000ML

## (undated) DEVICE — HOLDER STRIP-T SELF ADH 2X10IN

## (undated) DEVICE — PENCIL ELECSURG ROCKER 15FT

## (undated) DEVICE — COUNTER SHARPS DEVON 2 MAGNET